# Patient Record
Sex: MALE | Race: WHITE | NOT HISPANIC OR LATINO | Employment: OTHER | URBAN - METROPOLITAN AREA
[De-identification: names, ages, dates, MRNs, and addresses within clinical notes are randomized per-mention and may not be internally consistent; named-entity substitution may affect disease eponyms.]

---

## 2019-08-12 ENCOUNTER — TELEPHONE (OUTPATIENT)
Dept: CARDIOLOGY | Facility: CLINIC | Age: 74
End: 2019-08-12

## 2019-08-12 NOTE — TELEPHONE ENCOUNTER
With Dr. Kirby covering Cath Lab patient will need to see one of the other doctors  Dr. Kirby schedule is full

## 2019-08-12 NOTE — TELEPHONE ENCOUNTER
----- Message from Esperanza MURDOCK Menra sent at 8/12/2019  2:28 PM EDT -----  Regarding: Referral to Dr Kirby  PCP/Referring: Valencia Andrade   DX: calcification of coronary artery   Requested Dr. Kirby   Please advise.  Thank you,  Esperanza

## 2019-08-29 ENCOUNTER — OFFICE VISIT (OUTPATIENT)
Dept: CARDIOLOGY | Facility: CLINIC | Age: 74
End: 2019-08-29

## 2019-08-29 VITALS
DIASTOLIC BLOOD PRESSURE: 80 MMHG | WEIGHT: 211 LBS | HEIGHT: 69 IN | BODY MASS INDEX: 31.25 KG/M2 | SYSTOLIC BLOOD PRESSURE: 136 MMHG | HEART RATE: 61 BPM

## 2019-08-29 DIAGNOSIS — I25.10 CORONARY ARTERY DISEASE INVOLVING NATIVE CORONARY ARTERY OF NATIVE HEART WITHOUT ANGINA PECTORIS: Primary | ICD-10-CM

## 2019-08-29 DIAGNOSIS — I10 ESSENTIAL HYPERTENSION: ICD-10-CM

## 2019-08-29 DIAGNOSIS — E78.2 MIXED HYPERLIPIDEMIA: ICD-10-CM

## 2019-08-29 PROCEDURE — 99204 OFFICE O/P NEW MOD 45 MIN: CPT | Performed by: INTERNAL MEDICINE

## 2019-08-29 RX ORDER — LOSARTAN POTASSIUM 50 MG/1
50 TABLET ORAL DAILY
Refills: 5 | COMMUNITY
Start: 2019-08-02 | End: 2022-11-04 | Stop reason: SDUPTHER

## 2019-08-29 RX ORDER — ALLOPURINOL 100 MG/1
100 TABLET ORAL 2 TIMES DAILY
COMMUNITY

## 2019-08-29 RX ORDER — ASPIRIN 81 MG/1
81 TABLET ORAL DAILY
COMMUNITY
End: 2022-11-17

## 2019-08-29 RX ORDER — LOVASTATIN 10 MG/1
1 TABLET ORAL DAILY
Refills: 2 | COMMUNITY
Start: 2019-08-08 | End: 2019-09-20 | Stop reason: SDUPTHER

## 2019-08-29 RX ORDER — WARFARIN SODIUM 4 MG/1
4 TABLET ORAL DAILY
COMMUNITY

## 2019-08-29 RX ORDER — METOPROLOL SUCCINATE 25 MG/1
0.5 TABLET, EXTENDED RELEASE ORAL DAILY
Refills: 0 | COMMUNITY
Start: 2019-08-03

## 2019-09-04 PROBLEM — I25.10 CORONARY ARTERY DISEASE INVOLVING NATIVE CORONARY ARTERY OF NATIVE HEART WITHOUT ANGINA PECTORIS: Status: ACTIVE | Noted: 2019-09-04

## 2019-09-04 PROBLEM — I10 ESSENTIAL HYPERTENSION: Status: ACTIVE | Noted: 2019-09-04

## 2019-09-04 PROBLEM — E78.2 MIXED HYPERLIPIDEMIA: Status: ACTIVE | Noted: 2019-09-04

## 2019-09-04 PROCEDURE — 93000 ELECTROCARDIOGRAM COMPLETE: CPT | Performed by: INTERNAL MEDICINE

## 2019-09-19 ENCOUNTER — TELEPHONE (OUTPATIENT)
Dept: CARDIOLOGY | Facility: CLINIC | Age: 74
End: 2019-09-19

## 2019-09-19 NOTE — TELEPHONE ENCOUNTER
Pt called to ask about whether he needs to start a medication like Repatha, like you mentioned at his office visit.  I called and got the lab work from his PCP done on 9/4/19 and put it in your inbox to review.  Please advise.    ThanksSylvie    On 8/29/19:     IMPRESSION/PLAN:  1.  Coronary artery disease with prior PCI to the OM.  Continue aspirin and statin.  We will request his more recent lab work including a lipid panel from your office.  No angina.  No ischemic workup at this time.

## 2019-09-20 RX ORDER — LOVASTATIN 20 MG/1
20 TABLET ORAL DAILY
Qty: 30 TABLET | Refills: 1 | Status: SHIPPED | OUTPATIENT
Start: 2019-09-20 | End: 2019-09-20 | Stop reason: SDUPTHER

## 2019-09-20 RX ORDER — LOVASTATIN 20 MG/1
20 TABLET ORAL DAILY
Qty: 90 TABLET | Refills: 1 | Status: SHIPPED | OUTPATIENT
Start: 2019-09-20 | End: 2019-10-22

## 2019-09-20 NOTE — TELEPHONE ENCOUNTER
Pt called back and updated his phone list.     He said that after getting off Crestor his muscle aching went from an 8/10 to 4/10. When he started the Lovastatin it stayed at 4/10. He said he would be willing to try doubling his Lovastatin and if that causes an increase in myalgia, switch to Repatha.    I called the pt saran and sent in an increased dose of the Lovastatin.    Sylvie

## 2019-10-29 ENCOUNTER — TELEPHONE (OUTPATIENT)
Dept: CARDIOLOGY | Facility: CLINIC | Age: 74
End: 2019-10-29

## 2020-03-27 ENCOUNTER — TELEMEDICINE (OUTPATIENT)
Dept: CARDIOLOGY | Facility: CLINIC | Age: 75
End: 2020-03-27

## 2020-03-27 VITALS
DIASTOLIC BLOOD PRESSURE: 82 MMHG | HEART RATE: 82 BPM | WEIGHT: 205 LBS | SYSTOLIC BLOOD PRESSURE: 132 MMHG | HEIGHT: 69 IN | BODY MASS INDEX: 30.36 KG/M2

## 2020-03-27 DIAGNOSIS — I10 ESSENTIAL HYPERTENSION: ICD-10-CM

## 2020-03-27 DIAGNOSIS — I25.10 CORONARY ARTERY DISEASE INVOLVING NATIVE CORONARY ARTERY OF NATIVE HEART WITHOUT ANGINA PECTORIS: ICD-10-CM

## 2020-03-27 DIAGNOSIS — E78.2 MIXED HYPERLIPIDEMIA: Primary | ICD-10-CM

## 2020-03-27 PROCEDURE — 99214 OFFICE O/P EST MOD 30 MIN: CPT | Performed by: INTERNAL MEDICINE

## 2020-03-27 RX ORDER — LOVASTATIN 20 MG/1
TABLET ORAL
COMMUNITY
Start: 2020-03-24 | End: 2020-08-07 | Stop reason: SDUPTHER

## 2020-03-27 RX ORDER — WARFARIN SODIUM 3 MG/1
3 TABLET ORAL DAILY
Status: ON HOLD | COMMUNITY
Start: 2020-03-22 | End: 2022-09-14

## 2020-03-27 RX ORDER — FINASTERIDE 5 MG/1
5 TABLET, FILM COATED ORAL DAILY
Status: ON HOLD | COMMUNITY
Start: 2020-03-11 | End: 2022-09-14

## 2020-03-27 NOTE — PROGRESS NOTES
Miguel Espitia  1945  Date of Office Visit: 03/27/20  Encounter Provider: Tra Ashley MD  Place of Service: [facility] CARDIOLOGY    TELEHEALTH VISIT    CHIEF COMPLAINT:  Coronary disease with prior PCI to the OM  Hyperlipidemia  Hypertension  History of DVT and PE       HISTORY OF PRESENT ILLNESS:  This patient has consented to a telehealth visit via phone. I spent 15 minutes in total including but not limited to the direct conversation with this patient. All vitals recorded within this visit are reported by the patient.  A very pleasant 74-year-old male with medical history of coronary artery disease and prior PCI to the OM branch in 2011, prior DVT and PE, hypertension and hyperlipidemia, who presents to me as a telehealth f/u.   His last ejection fraction was 65% in 2017. He had mild concentric LVH at that time.    The patient states that he has been doing very well since our last visit. He actually did not start taking the Repatha as he stated it was too expensive and has started back on the lovastatin. He reports that he has been working his way back up and now almost every day takes 20 mg of lovastatin. He was due for lab work last week but did not have that done with the coronavirus issues.    He did have dyspnea on exertion when pulling a bunch of bushes out in the yard recently but has not had any previous issues with his normal exercise routine.        Review of Systems   Constitution: Negative for fever and malaise/fatigue.   HENT: Negative for nosebleeds and sore throat.    Eyes: Negative for blurred vision and double vision.   Cardiovascular: Negative for chest pain, claudication, palpitations and syncope.   Respiratory: Negative for cough, shortness of breath and snoring.    Endocrine: Negative for cold intolerance, heat intolerance and polydipsia.   Skin: Negative for itching, poor wound healing and rash.   Musculoskeletal: Negative for joint pain, joint  swelling, muscle weakness and myalgias.   Gastrointestinal: Negative for abdominal pain, melena, nausea and vomiting.   Neurological: Negative for light-headedness, loss of balance, seizures, vertigo and weakness.   Psychiatric/Behavioral: Negative for altered mental status and depression.       Past Medical History:   Diagnosis Date   • Abdominal pain, left lower quadrant    • Abnormal glucose    • Abnormal urine findings    • Acute upper respiratory infection    • Anticoagulated on warfarin    • Atopic dermatitis    • Mendiola's esophagus    • Belching    • Benign enlargement of prostate    • Body aches    • Calcification of coronary artery    • Cough    • Esophageal reflux    • Gout    • Greater trochanteric bursitis of right hip    • History of colon polyps    • Hypercholesteremia    • Hyperglycemia    • Inability to attain erection    • Incidental pulmonary nodule    • Infected nailbed of toe, unspecified laterality    • Influenza B    • Left hip pain    • LLQ abdominal tenderness    • Low back pain    • Lumbar strain    • Neck pain    • Obesity    • Personal history of PE (pulmonary embolism)    • Pes anserinus bursitis of right knee    • Renal cyst    • Right hip pain    • Screen for colon cancer    • Shoulder pain, right    • Sore throat    • Stye    • Tinea versicolor    • TMJ arthralgia    • Trigger finger    • Venous thrombosis    • Vertigo    • Warfarin anticoagulation        The following portions of the patient's history were reviewed and updated as appropriate: Social history , Family history and Surgical history     Current Outpatient Medications on File Prior to Visit   Medication Sig Dispense Refill   • allopurinol (ZYLOPRIM) 100 MG tablet Take 100 mg by mouth 2 (Two) Times a Day.     • aspirin 81 MG EC tablet Take 81 mg by mouth Daily.     • Evolocumab (REPATHA) 140 MG/ML solution prefilled syringe Inject 1 mL under the skin into the appropriate area as directed Every 14 (Fourteen) Days. 2.1 mL 2  "  • losartan (COZAAR) 50 MG tablet Take 100 mg by mouth Daily.  5   • metoprolol succinate XL (TOPROL-XL) 25 MG 24 hr tablet Take 1 tablet by mouth Daily.  0   • warfarin (COUMADIN) 4 MG tablet Take 4 mg by mouth Daily.       No current facility-administered medications on file prior to visit.        Allergies   Allergen Reactions   • Crestor [Rosuvastatin Calcium] Myalgia   • Pravastatin Sodium Myalgia   • Statins Myalgia   • Codeine Rash       Vitals:    03/27/20 1040   BP: 132/82   Pulse: 82   Weight: 93 kg (205 lb)   Height: 175.3 cm (69\")       No results found for: CBC  No results found for: CMP  No components found for: LIPID  No results found for: BMP    Procedures    DISCUSSION/SUMMARY  74-year-old male with a medical history of coronary artery disease with prior PCI to the obtuse marginal branch with a Cypher drug eluting stent, hypertension, hyperlipidemia, and history of PE and DVT, on anticoagulant therapy, who presents to me in f/u.  He is doing very well.  He has a preserved left and right ventricular systolic function.    He denies any new symptoms other than dyspnea on exertion with what sounds to be moderate to high level of activity recently. He has no chest pain. He is back on a statin.          1.  Coronary artery disease with prior PCI to the OM.  Continue aspirin and statin.  We will request his more recent lab work including a lipid panel from your office.  No angina.  No ischemic workup at this time.  2.  Essential hypertension, at goal.  Continue current therapy.  3.  DVT/PE.  This sounds to be unprovoked.  I would recommend continuation of anticoagulant therapy.  He has previously followed with the hematology/oncology group at [place].    I plan to see him back in 6 months or earlier with problems.    "

## 2020-06-23 RX ORDER — LOVASTATIN 20 MG/1
TABLET ORAL
Qty: 90 TABLET | OUTPATIENT
Start: 2020-06-23

## 2020-08-07 RX ORDER — LOVASTATIN 20 MG/1
20 TABLET ORAL NIGHTLY
Qty: 90 TABLET | Refills: 0 | Status: SHIPPED | OUTPATIENT
Start: 2020-08-07 | End: 2020-10-16

## 2020-10-16 ENCOUNTER — LAB (OUTPATIENT)
Dept: LAB | Facility: HOSPITAL | Age: 75
End: 2020-10-16

## 2020-10-16 ENCOUNTER — OFFICE VISIT (OUTPATIENT)
Dept: CARDIOLOGY | Facility: CLINIC | Age: 75
End: 2020-10-16

## 2020-10-16 VITALS
SYSTOLIC BLOOD PRESSURE: 134 MMHG | BODY MASS INDEX: 31.84 KG/M2 | DIASTOLIC BLOOD PRESSURE: 84 MMHG | HEART RATE: 76 BPM | HEIGHT: 69 IN | OXYGEN SATURATION: 95 % | WEIGHT: 215 LBS | RESPIRATION RATE: 18 BRPM

## 2020-10-16 DIAGNOSIS — E78.2 MIXED HYPERLIPIDEMIA: ICD-10-CM

## 2020-10-16 DIAGNOSIS — I25.10 CORONARY ARTERY DISEASE INVOLVING NATIVE CORONARY ARTERY OF NATIVE HEART WITHOUT ANGINA PECTORIS: Primary | ICD-10-CM

## 2020-10-16 DIAGNOSIS — I10 ESSENTIAL HYPERTENSION: ICD-10-CM

## 2020-10-16 DIAGNOSIS — Z86.711 HISTORY OF PULMONARY EMBOLUS (PE): ICD-10-CM

## 2020-10-16 LAB
ALBUMIN SERPL-MCNC: 4 G/DL (ref 3.5–5.2)
ALP SERPL-CCNC: 97 U/L (ref 39–117)
ALT SERPL W P-5'-P-CCNC: 21 U/L (ref 1–41)
AST SERPL-CCNC: 26 U/L (ref 1–40)
BILIRUB CONJ SERPL-MCNC: 0.2 MG/DL (ref 0–0.3)
BILIRUB INDIRECT SERPL-MCNC: 0.2 MG/DL
BILIRUB SERPL-MCNC: 0.4 MG/DL (ref 0–1.2)
CHOLEST SERPL-MCNC: 176 MG/DL (ref 0–200)
HDLC SERPL-MCNC: 40 MG/DL (ref 40–60)
LDLC SERPL CALC-MCNC: 79 MG/DL (ref 0–100)
LDLC/HDLC SERPL: 1.64 {RATIO}
PROT SERPL-MCNC: 7.1 G/DL (ref 6–8.5)
TRIGL SERPL-MCNC: 353 MG/DL (ref 0–150)
VLDLC SERPL-MCNC: 57 MG/DL (ref 5–40)

## 2020-10-16 PROCEDURE — 99214 OFFICE O/P EST MOD 30 MIN: CPT | Performed by: NURSE PRACTITIONER

## 2020-10-16 PROCEDURE — 80076 HEPATIC FUNCTION PANEL: CPT

## 2020-10-16 PROCEDURE — 93000 ELECTROCARDIOGRAM COMPLETE: CPT | Performed by: NURSE PRACTITIONER

## 2020-10-16 PROCEDURE — 80061 LIPID PANEL: CPT

## 2020-10-16 PROCEDURE — 36415 COLL VENOUS BLD VENIPUNCTURE: CPT

## 2020-10-16 RX ORDER — KETOCONAZOLE 20 MG/G
CREAM TOPICAL
Status: ON HOLD | COMMUNITY
Start: 2020-08-18 | End: 2022-09-14

## 2020-10-16 NOTE — PROGRESS NOTES
Date of Office Visit: 10/16/2020  Encounter Provider: MONTEZ Hazel  Place of Service: [facility] CARDIOLOGY  Patient Name: Miguel Espitia  :1945    Chief Complaint   Patient presents with   • Coronary Artery Disease     1 YR FOLLOW UP   • Hyperlipidemia   • Hypertension   :     HPI: Miguel Espitia is a 75 y.o. male, new to me, who presents today for follow-up.  Old records have been obtained and reviewed by me.  He is a patient of Dr. Ashley's with a past cardiac history significant for hypertension, hyperlipidemia, DVT and PE (anticoagulated on warfarin), and coronary artery disease.  In March of this year, he had a telehealth visit with Dr. Ashley at which time he was doing well with no complaints of angina or heart failure.  No changes were made to his medical regimen, and he was advised to follow-up in 6 months.   Overall he has been doing well.  He denies any chest pain, shortness of breath, palpitations, edema, dizziness, or syncope.  He denies any bleeding difficulties or melena.  He developed pretty severe muscle cramping in his thighs so he started taking his lovastatin every other day.  Although it has improved, it is still present.  Prior to the Covid pandemic, he was going to the [facility] on a regular basis and had lost about 15 pounds.  Unfortunately, since he has not been able to go to the gym he has gained most of it back.  He does continue to walk 6 or 7 days a week.      Past Medical History:   Diagnosis Date   • Abdominal pain, left lower quadrant    • Abnormal glucose    • Abnormal urine findings    • Acute upper respiratory infection    • Anticoagulated on warfarin    • Atopic dermatitis    • Mendiola's esophagus    • Belching    • Benign enlargement of prostate    • Body aches    • Calcification of coronary artery    • Cough    • Esophageal reflux    • Gout    • Greater trochanteric bursitis of right hip    • History of colon polyps    •  Hypercholesteremia    • Hyperglycemia    • Inability to attain erection    • Incidental pulmonary nodule    • Infected nailbed of toe, unspecified laterality    • Influenza B    • Left hip pain    • LLQ abdominal tenderness    • Low back pain    • Lumbar strain    • Neck pain    • Obesity    • Personal history of PE (pulmonary embolism)    • Pes anserinus bursitis of right knee    • Renal cyst    • Right hip pain    • Screen for colon cancer    • Shoulder pain, right    • Sore throat    • Stye    • Tinea versicolor    • TMJ arthralgia    • Trigger finger    • Venous thrombosis    • Vertigo    • Warfarin anticoagulation        Past Surgical History:   Procedure Laterality Date   • CARDIAC CATHETERIZATION  11/04/2011    x1 stent at [facility]   • CORONARY ANGIOPLASTY WITH STENT PLACEMENT  11/04/2011    The patient underwent successful angioplasty and stent to the marginal and 95% stenosis reduced to 0%.   • HAND TENDON SURGERY      TENDON REPAIR RIGHT   • HERNIA REPAIR         Social History     Socioeconomic History   • Marital status:      Spouse name: Not on file   • Number of children: Not on file   • Years of education: Not on file   • Highest education level: Not on file   Tobacco Use   • Smoking status: Never Smoker   • Smokeless tobacco: Never Used   • Tobacco comment: CAFFEINE USE: 1 CUP COFFEE DAILY   Substance and Sexual Activity   • Alcohol use: Yes     Alcohol/week: 1.0 - 2.0 standard drinks     Types: 1 - 2 Cans of beer per week     Frequency: 4 or more times a week     Binge frequency: Weekly   • Drug use: No   • Sexual activity: Defer       Family History   Problem Relation Age of Onset   • Arthritis Other    • No Known Problems Mother    • Throat cancer Father        Review of Systems   Constitution: Negative for chills, fever and malaise/fatigue.   Cardiovascular: Negative for chest pain, dyspnea on exertion, leg swelling, near-syncope, orthopnea, palpitations, paroxysmal nocturnal  "dyspnea and syncope.   Respiratory: Negative for cough and shortness of breath.    Musculoskeletal: Positive for myalgias. Negative for joint pain and joint swelling.   Gastrointestinal: Negative for abdominal pain, diarrhea, melena, nausea and vomiting.   Genitourinary: Negative for frequency and hematuria.   Neurological: Negative for light-headedness, numbness, paresthesias and seizures.   Allergic/Immunologic: Negative.    All other systems reviewed and are negative.      Allergies   Allergen Reactions   • Crestor [Rosuvastatin Calcium] Myalgia   • Pravastatin Sodium Myalgia   • Statins Myalgia   • Codeine Rash         Current Outpatient Medications:   •  allopurinol (ZYLOPRIM) 100 MG tablet, Take 100 mg by mouth 2 (Two) Times a Day., Disp: , Rfl:   •  aspirin 81 MG EC tablet, Take 81 mg by mouth Daily., Disp: , Rfl:   •  finasteride (PROSCAR) 5 MG tablet, Take 5 mg by mouth Daily., Disp: , Rfl:   •  ketoconazole (NIZORAL) 2 % cream, APPLY TO GROIN BID PRN, Disp: , Rfl:   •  losartan (COZAAR) 50 MG tablet, Take 50 mg by mouth Daily., Disp: , Rfl: 5  •  metoprolol succinate XL (TOPROL-XL) 25 MG 24 hr tablet, Take 1 tablet by mouth Daily., Disp: , Rfl: 0  •  warfarin (COUMADIN) 3 MG tablet, Take 3 mg by mouth Daily., Disp: , Rfl:   •  warfarin (COUMADIN) 4 MG tablet, Take 4 mg by mouth Daily., Disp: , Rfl:   •  Evolocumab 140 MG/ML solution auto-injector, Inject 1 mL under the skin into the appropriate area as directed Every 14 (Fourteen) Days., Disp: 2 pen, Rfl: 11      Objective:     Vitals:    10/16/20 1110 10/16/20 1121   BP: 134/84 134/84   BP Location: Right arm Left arm   Patient Position: Sitting Sitting   Pulse: 76    Resp: 18    SpO2: 95%    Weight: 97.5 kg (215 lb)    Height: 175.3 cm (69\")      Body mass index is 31.75 kg/m².    PHYSICAL EXAM:    Constitutional:       General: Not in acute distress.     Appearance: Well-developed. Not diaphoretic.   Eyes:      Pupils: Pupils are equal, round, and " reactive to light.   HENT:      Head: Normocephalic and atraumatic.   Neck:      Thyroid: No thyromegaly.      Vascular: No JVD.   Pulmonary:      Effort: Pulmonary effort is normal. No respiratory distress.      Breath sounds: Normal breath sounds.   Cardiovascular:      Normal rate. Regular rhythm.   Pulses:     Intact distal pulses.   Abdominal:      General: Bowel sounds are normal. There is no distension.      Palpations: Abdomen is soft. There is no hepatomegaly or splenomegaly.      Tenderness: There is no abdominal tenderness.   Musculoskeletal: Normal range of motion.   Skin:     General: Skin is warm and dry.      Findings: No erythema.   Neurological:      Mental Status: Alert and oriented to person, place, and time.   Psychiatric:         Behavior: Behavior normal.         Judgment: Judgment normal.           ECG 12 Lead    Date/Time: 10/16/2020 11:27 AM  Performed by: Lucia Oswald APRN  Authorized by: Lucia Oswald APRN   Comparison: compared with previous ECG from 8/29/2019  Similar to previous ECG  Rhythm: sinus rhythm  Rate: normal  BPM: 65    Clinical impression: normal ECG  Comments: Indication: CAD              Assessment:       Diagnosis Plan   1. Coronary artery disease involving native coronary artery of native heart without angina pectoris  ECG 12 Lead   2. Essential hypertension     3. Mixed hyperlipidemia  Lipid Panel    Hepatic Function Panel    Evolocumab 140 MG/ML solution auto-injector   4. History of pulmonary embolus (PE)       Orders Placed This Encounter   Procedures   • Lipid Panel     Standing Status:   Future     Standing Expiration Date:   10/16/2021   • Hepatic Function Panel     Standing Status:   Future     Standing Expiration Date:   10/16/2021   • ECG 12 Lead     This order was created via procedure documentation          Plan:       1.  Coronary artery disease.  He denies any symptoms of angina.  He is on good medical therapy.      2.  Hypertension.  His  blood pressure is well controlled.      3.  Hyperlipidemia.  The last lipid panel I have on file is from September 2019.  This revealed an LDL of 94 and an HDL of 42.  He is reporting myalgias on the lovastatin.  He has previously been intolerant to rosuvastatin and pravastatin.  At this point, I am going to have him stop the lovastatin and instead prescribe Repatha.  I will send him to the lab for a lipid panel and hepatic function panel.      4.  History of DVT and PE.  Continue warfarin.      Overall think he is stable and doing well.  He denies any symptoms of angina or heart failure.  He will follow-up with Dr. Ashley in 6 months or sooner if needed.      As always, it has been a pleasure to participate in your patient's care.      Sincerely,         MONTEZ Rivrea

## 2020-10-19 ENCOUNTER — TELEPHONE (OUTPATIENT)
Dept: CARDIOLOGY | Facility: CLINIC | Age: 75
End: 2020-10-19

## 2020-10-19 NOTE — TELEPHONE ENCOUNTER
Please follow-up on the Repatha which I ordered on Friday.      Also let him know that his labs are stable.  His triglycerides are elevated, so he should focus on weight loss and limiting his sugar intake.

## 2020-10-21 NOTE — TELEPHONE ENCOUNTER
Pt returned my call stating to L/M if he didn't answer upon returning his call.    I L/M advising of labs and recommendations. Advised pt to call office w/ any further questions or concerns.    LO Garza

## 2020-10-26 NOTE — TELEPHONE ENCOUNTER
Pt L/M confirming he had received my message. Pt requested I leave lab level #'s on his VM, if he didn't answer upon returning his call.    I L/M advising of lab levels. I also advised that he call me back re: needing some assistance w/ paying for Repatha, to to the cost of $400. As, I will need some info from him start initiate pt assistance.    LO Garza

## 2021-01-19 ENCOUNTER — TELEPHONE (OUTPATIENT)
Dept: CARDIOLOGY | Facility: CLINIC | Age: 76
End: 2021-01-19

## 2021-01-19 DIAGNOSIS — E78.2 MIXED HYPERLIPIDEMIA: ICD-10-CM

## 2021-01-19 NOTE — TELEPHONE ENCOUNTER
Praluent Approved through 7/19/2021.    L/M advising pt of change. Advised to call office w/ any questions or issues getting medication.    LO Garza

## 2021-01-19 NOTE — TELEPHONE ENCOUNTER
----- Message from Kayla Buenrostro MA sent at 1/19/2021  3:31 PM EST -----  Regarding: Med Change  Pt needs Praluent instead of Repatha. Already did PA by accident, but it was for Praluent and it was approved.    My mistake.

## 2021-01-21 NOTE — TELEPHONE ENCOUNTER
S/W pt re: change. Pt stated he now has a pt assist program suggested to him by Alonso Lewis that is now fully covering his Repatha. Pt stated he has been doing great w/ the Repatha and requested we cancel Praluent Rx and replace Repatha. Verbalized understanding.  Place change Rx back to Repatha.    LO Garza

## 2021-01-22 NOTE — TELEPHONE ENCOUNTER
L/M advising pt of changed. Advised to call office w/ any further questions or concerns.    LO Garza

## 2021-05-24 ENCOUNTER — OFFICE VISIT (OUTPATIENT)
Dept: CARDIOLOGY | Facility: CLINIC | Age: 76
End: 2021-05-24

## 2021-05-24 VITALS
HEIGHT: 69 IN | SYSTOLIC BLOOD PRESSURE: 132 MMHG | DIASTOLIC BLOOD PRESSURE: 84 MMHG | HEART RATE: 65 BPM | WEIGHT: 210 LBS | BODY MASS INDEX: 31.1 KG/M2

## 2021-05-24 DIAGNOSIS — E78.2 MIXED HYPERLIPIDEMIA: Primary | ICD-10-CM

## 2021-05-24 DIAGNOSIS — I25.10 CORONARY ARTERY DISEASE INVOLVING NATIVE CORONARY ARTERY OF NATIVE HEART WITHOUT ANGINA PECTORIS: ICD-10-CM

## 2021-05-24 DIAGNOSIS — Z86.711 HISTORY OF PULMONARY EMBOLUS (PE): ICD-10-CM

## 2021-05-24 DIAGNOSIS — I10 ESSENTIAL HYPERTENSION: ICD-10-CM

## 2021-05-24 PROCEDURE — 93000 ELECTROCARDIOGRAM COMPLETE: CPT | Performed by: INTERNAL MEDICINE

## 2021-05-24 PROCEDURE — 99214 OFFICE O/P EST MOD 30 MIN: CPT | Performed by: INTERNAL MEDICINE

## 2021-05-24 NOTE — PROGRESS NOTES
Date of Office Visit: 21  Encounter Provider: Tra Ashley MD  Place of Service: [facility] CARDIOLOGY  Patient Name: Miguel Espitia  :1945    Chief Complaint   Patient presents with   • Coronary artery disease involving native coronary artery of    • Follow-up   :     HPI:  75 y.o. male who presents today for follow-up.  Old records have been obtained and reviewed by me.  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  In 2020, he had a telehealth visit with me at which time he was doing well with no complaints of angina or heart failure.  No changes were made to his medical regimen, and he was advised to follow-up in 6 months.    He presents back to see me and is doing very well.  He denies any chest pain or dyspnea.  He continues to tolerate his warfarin therapy with no bleeding complications.  His blood pressure and heart rate are well controlled.  The Repatha seems to be working well for him, and his LDL is very well controlled.             Past Medical History:   Diagnosis Date   • Abdominal pain, left lower quadrant    • Abnormal glucose    • Abnormal urine findings    • Acute upper respiratory infection    • Anticoagulated on warfarin    • Atopic dermatitis    • Mendiola's esophagus    • Belching    • Benign enlargement of prostate    • Body aches    • Calcification of coronary artery    • Cough    • Esophageal reflux    • Gout    • Greater trochanteric bursitis of right hip    • History of colon polyps    • Hypercholesteremia    • Hyperglycemia    • Inability to attain erection    • Incidental pulmonary nodule    • Infected nailbed of toe, unspecified laterality    • Influenza B    • Left hip pain    • LLQ abdominal tenderness    • Low back pain    • Lumbar strain    • Neck pain    • Obesity    • Personal history of PE (pulmonary  embolism)    • Pes anserinus bursitis of right knee    • Renal cyst    • Right hip pain    • Screen for colon cancer    • Shoulder pain, right    • Sore throat    • Stye    • Tinea versicolor    • TMJ arthralgia    • Trigger finger    • Venous thrombosis    • Vertigo    • Warfarin anticoagulation        Past Surgical History:   Procedure Laterality Date   • CARDIAC CATHETERIZATION  11/04/2011    x1 stent at [facility]   • CORONARY ANGIOPLASTY WITH STENT PLACEMENT  11/04/2011    The patient underwent successful angioplasty and stent to the marginal and 95% stenosis reduced to 0%.   • HAND TENDON SURGERY      TENDON REPAIR RIGHT   • HERNIA REPAIR         Social History     Socioeconomic History   • Marital status:      Spouse name: Not on file   • Number of children: Not on file   • Years of education: Not on file   • Highest education level: Not on file   Tobacco Use   • Smoking status: Never Smoker   • Smokeless tobacco: Never Used   • Tobacco comment: CAFFEINE USE: 1 CUP COFFEE DAILY   Substance and Sexual Activity   • Alcohol use: Yes     Alcohol/week: 1.0 - 2.0 standard drinks     Types: 1 - 2 Cans of beer per week   • Drug use: No   • Sexual activity: Defer       Family History   Problem Relation Age of Onset   • Arthritis Other    • No Known Problems Mother    • Throat cancer Father        Review of Systems   Constitutional: Negative for chills, fever and malaise/fatigue.   Cardiovascular: Negative for chest pain, dyspnea on exertion, leg swelling, near-syncope, orthopnea, palpitations, paroxysmal nocturnal dyspnea and syncope.   Respiratory: Negative for cough and shortness of breath.    Musculoskeletal: Positive for myalgias. Negative for joint pain and joint swelling.   Gastrointestinal: Negative for abdominal pain, diarrhea, melena, nausea and vomiting.   Genitourinary: Negative for frequency and hematuria.   Neurological: Negative for light-headedness, numbness, paresthesias and seizures.  "  Allergic/Immunologic: Negative.    All other systems reviewed and are negative.      Allergies   Allergen Reactions   • Crestor [Rosuvastatin Calcium] Myalgia   • Pravastatin Sodium Myalgia   • Statins Myalgia   • Codeine Rash         Current Outpatient Medications:   •  allopurinol (ZYLOPRIM) 100 MG tablet, Take 100 mg by mouth 2 (Two) Times a Day., Disp: , Rfl:   •  aspirin 81 MG EC tablet, Take 81 mg by mouth Daily., Disp: , Rfl:   •  Evolocumab (REPATHA) solution auto-injector SureClick injection, Inject 1 mL under the skin into the appropriate area as directed Every 14 (Fourteen) Days., Disp: 2 pen, Rfl: 11  •  finasteride (PROSCAR) 5 MG tablet, Take 5 mg by mouth Daily., Disp: , Rfl:   •  ketoconazole (NIZORAL) 2 % cream, APPLY TO GROIN BID PRN, Disp: , Rfl:   •  losartan (COZAAR) 50 MG tablet, Take 50 mg by mouth Daily., Disp: , Rfl: 5  •  metoprolol succinate XL (TOPROL-XL) 25 MG 24 hr tablet, Take 1 tablet by mouth Daily., Disp: , Rfl: 0  •  warfarin (COUMADIN) 3 MG tablet, Take 3 mg by mouth Daily., Disp: , Rfl:   •  warfarin (COUMADIN) 4 MG tablet, Take 4 mg by mouth Daily., Disp: , Rfl:       Objective:     Vitals:    05/24/21 1253   BP: 132/84   BP Location: Left arm   Patient Position: Sitting   Pulse: 65   Weight: 95.3 kg (210 lb)   Height: 175.3 cm (69\")     Body mass index is 31.01 kg/m².    PHYSICAL EXAM:    Constitutional:       General: Not in acute distress.     Appearance: Well-developed. Not diaphoretic.   Eyes:      Pupils: Pupils are equal, round, and reactive to light.   HENT:      Head: Normocephalic and atraumatic.   Neck:      Thyroid: No thyromegaly.      Vascular: No JVD.   Pulmonary:      Effort: Pulmonary effort is normal. No respiratory distress.      Breath sounds: Normal breath sounds.   Cardiovascular:      Normal rate. Regular rhythm.   Pulses:     Intact distal pulses.   Abdominal:      General: Bowel sounds are normal. There is no distension.      Palpations: Abdomen is " soft. There is no hepatomegaly or splenomegaly.      Tenderness: There is no abdominal tenderness.   Musculoskeletal: Normal range of motion. Skin:     General: Skin is warm and dry.      Findings: No erythema.   Neurological:      Mental Status: Alert and oriented to person, place, and time.   Psychiatric:         Behavior: Behavior normal.         Judgment: Judgment normal.           ECG 12 Lead    Date/Time: 5/24/2021 1:25 PM  Performed by: Tra Ashley MD  Authorized by: Tra Ashley MD   Comparison: compared with previous ECG from 10/16/2020  Similar to previous ECG  Rhythm: sinus rhythm  Rate: normal  QRS axis: normal                Assessment:      No diagnosis found.  No orders of the defined types were placed in this encounter.         Plan:     This is a 75-year-old male with medical history of coronary artery disease, hypertension, hyperlipidemia and a prior history of DVT and pulmonary embolus on lifelong anticoagulant therapy.  He is doing very well and denies any chest pain.  He is tolerating his anticoagulation with no bleeding complications.  His lipid panel is much better controlled on Repatha.      1.  Coronary artery disease.  He denies any symptoms of angina.  He is on good medical therapy.  -Continue aspirin lifelong.  -Continue Repatha.  Tolerating well.  No issues with injection site rashes or significant soreness.    2.  Hypertension.  His blood pressure is well controlled.  -Continue losartan and Toprol therapy.  Lab work has been reviewed and no hyperkalemia or renal insufficiency.  No resting bradycardia or fatigue on beta-blockade.    3.  Hyperlipidemia.  Continue Repatha.  Tolerating well.  Lipid panel reviewed.  No elevation in transaminases on CMP.      4.  History of DVT and PE.  Continue warfarin.  Lifelong.

## 2021-12-01 ENCOUNTER — OFFICE VISIT (OUTPATIENT)
Dept: CARDIOLOGY | Facility: CLINIC | Age: 76
End: 2021-12-01

## 2021-12-01 VITALS
DIASTOLIC BLOOD PRESSURE: 72 MMHG | WEIGHT: 212 LBS | HEIGHT: 69 IN | BODY MASS INDEX: 31.4 KG/M2 | HEART RATE: 76 BPM | SYSTOLIC BLOOD PRESSURE: 116 MMHG

## 2021-12-01 DIAGNOSIS — I25.10 CORONARY ARTERY DISEASE INVOLVING NATIVE CORONARY ARTERY OF NATIVE HEART WITHOUT ANGINA PECTORIS: Primary | ICD-10-CM

## 2021-12-01 DIAGNOSIS — I10 ESSENTIAL HYPERTENSION: ICD-10-CM

## 2021-12-01 DIAGNOSIS — Z86.711 HISTORY OF PULMONARY EMBOLUS (PE): ICD-10-CM

## 2021-12-01 DIAGNOSIS — E78.2 MIXED HYPERLIPIDEMIA: ICD-10-CM

## 2021-12-01 PROCEDURE — 99214 OFFICE O/P EST MOD 30 MIN: CPT | Performed by: NURSE PRACTITIONER

## 2021-12-01 PROCEDURE — 93000 ELECTROCARDIOGRAM COMPLETE: CPT | Performed by: NURSE PRACTITIONER

## 2021-12-01 NOTE — PROGRESS NOTES
Date of Office Visit: 2021  Encounter Provider: MONTEZ Cano  Place of Service: [facility] CARDIOLOGY  Patient Name: Miguel Espitia  :1945    Chief Complaint   Patient presents with   • Coronary Artery Disease   :     HPI: Miguel Espitia is a 76 y.o. male.  He is a patient of Dr. Ashley's whom we follow for the management of hypertension, hyperlipidemia, and coronary artery disease.  In , he underwent successful angioplasty and stenting of the marginal.  He also has a history of DVT and PE and has been anticoagulated on warfarin.   He was last seen in the office by Dr. Ashley in May at which time he was doing well with no complaints of angina or heart failure.  No changes were made to his medical regimen, and he was advised to follow-up in 6 months.   From a cardiac standpoint, he has been doing well.  He denies any chest pain, shortness of breath, palpitations, edema, dizziness, or syncope.  He denies any bleeding difficulties or melena.  He suffered a left meniscus injury which has dampened his activity level.  He is in physical therapy.     Past Medical History:   Diagnosis Date   • Abdominal pain, left lower quadrant    • Abnormal glucose    • Abnormal urine findings    • Acute upper respiratory infection    • Anticoagulated on warfarin    • Atopic dermatitis    • Mendiola's esophagus    • Belching    • Benign enlargement of prostate    • Body aches    • Calcification of coronary artery    • Cough    • Esophageal reflux    • Gout    • Greater trochanteric bursitis of right hip    • History of colon polyps    • Hypercholesteremia    • Hyperglycemia    • Inability to attain erection    • Incidental pulmonary nodule    • Infected nailbed of toe, unspecified laterality    • Influenza B    • Left hip pain    • LLQ abdominal tenderness    • Low back pain    • Lumbar strain    • Neck pain    • Obesity    • Personal history of PE (pulmonary embolism)    • Pes  anserinus bursitis of right knee    • Renal cyst    • Right hip pain    • Screen for colon cancer    • Shoulder pain, right    • Sore throat    • Stye    • Tinea versicolor    • TMJ arthralgia    • Trigger finger    • Venous thrombosis    • Vertigo    • Warfarin anticoagulation        Past Surgical History:   Procedure Laterality Date   • CARDIAC CATHETERIZATION  11/04/2011    x1 stent at [facility]   • CORONARY ANGIOPLASTY WITH STENT PLACEMENT  11/04/2011    The patient underwent successful angioplasty and stent to the marginal and 95% stenosis reduced to 0%.   • HAND TENDON SURGERY      TENDON REPAIR RIGHT   • HERNIA REPAIR         Social History     Socioeconomic History   • Marital status:    Tobacco Use   • Smoking status: Never Smoker   • Smokeless tobacco: Never Used   • Tobacco comment: CAFFEINE USE: 1 CUP COFFEE DAILY   Substance and Sexual Activity   • Alcohol use: Yes     Alcohol/week: 1.0 - 2.0 standard drink     Types: 1 - 2 Cans of beer per week   • Drug use: No   • Sexual activity: Defer       Family History   Problem Relation Age of Onset   • Arthritis Other    • No Known Problems Mother    • Throat cancer Father        Review of Systems   Constitutional: Negative.   Cardiovascular: Negative.  Negative for chest pain, dyspnea on exertion, leg swelling, orthopnea, paroxysmal nocturnal dyspnea and syncope.   Respiratory: Negative.    Hematologic/Lymphatic: Negative for bleeding problem.   Musculoskeletal: Negative for falls.   Gastrointestinal: Negative for melena.   Neurological: Negative for dizziness and light-headedness.       Allergies   Allergen Reactions   • Crestor [Rosuvastatin Calcium] Myalgia   • Pravastatin Sodium Myalgia   • Statins Myalgia   • Codeine Rash         Current Outpatient Medications:   •  allopurinol (ZYLOPRIM) 100 MG tablet, Take 100 mg by mouth 2 (Two) Times a Day., Disp: , Rfl:   •  aspirin 81 MG EC tablet, Take 81 mg by mouth Daily., Disp: , Rfl:   •   "Evolocumab (REPATHA) solution auto-injector SureClick injection, Inject 1 mL under the skin into the appropriate area as directed Every 14 (Fourteen) Days., Disp: 2 pen, Rfl: 11  •  finasteride (PROSCAR) 5 MG tablet, Take 5 mg by mouth Daily., Disp: , Rfl:   •  ketoconazole (NIZORAL) 2 % cream, APPLY TO GROIN BID PRN, Disp: , Rfl:   •  losartan (COZAAR) 50 MG tablet, Take 50 mg by mouth Daily., Disp: , Rfl: 5  •  metoprolol succinate XL (TOPROL-XL) 25 MG 24 hr tablet, Take 0.5 tablets by mouth Daily., Disp: , Rfl: 0  •  warfarin (COUMADIN) 3 MG tablet, Take 3 mg by mouth Daily., Disp: , Rfl:   •  warfarin (COUMADIN) 4 MG tablet, Take 4 mg by mouth Daily., Disp: , Rfl:       Objective:     Vitals:    12/01/21 1305   BP: 116/72   Pulse: 76   Weight: 96.2 kg (212 lb)   Height: 175.3 cm (69\")     Body mass index is 31.31 kg/m².    PHYSICAL EXAM:    Neck:      Vascular: No JVD.   Pulmonary:      Effort: Pulmonary effort is normal.      Breath sounds: Normal breath sounds.   Cardiovascular:      Normal rate. Regular rhythm.      Murmurs: There is no murmur.      No gallop. No click. No rub.   Pulses:     Intact distal pulses.           ECG 12 Lead    Date/Time: 12/1/2021 1:13 PM  Performed by: Lucia Oswald APRN  Authorized by: Lucia Oswald APRN   Comparison: compared with previous ECG from 5/24/2021  Similar to previous ECG  Rhythm: sinus rhythm  Rate: normal  BPM: 76  Other findings: non-specific ST-T wave changes    Clinical impression: normal ECG  Comments: Indication: CAD              Assessment:       Diagnosis Plan   1. Coronary artery disease involving native coronary artery of native heart without angina pectoris  ECG 12 Lead   2. Essential hypertension     3. Mixed hyperlipidemia     4. History of pulmonary embolus (PE)       Orders Placed This Encounter   Procedures   • ECG 12 Lead     This order was created via procedure documentation     Order Specific Question:   Release to patient     Answer: "   Immediate          Plan:       1. Coronary artery disease.  Continue aspirin and Repatha.      2.   Hypertension.  His blood pressure looks great.  Continue losartan and Toprol.      3.   Hyperlipidemia.  He is statin intolerant.  Continue Repatha.      4.   History of DVT/PE.  Continue warfarin lifelong.      I think he is doing well.  He denies any symptoms of angina or heart failure.  I am not going to make any changes, and he will follow-up with Dr. Ashley in 6 months.      As always, it has been a pleasure to participate in your patient's care.      Sincerely,         MONTEZ Rivera

## 2022-01-06 DIAGNOSIS — E78.2 MIXED HYPERLIPIDEMIA: ICD-10-CM

## 2022-01-07 NOTE — TELEPHONE ENCOUNTER
Please advise filling Alonso THOMAS   -   12/1/21 Lucia Colon   -   6/23/22  Semder  Last labs   -   No recent Lipid Panel    Michelle PERSAUD

## 2022-01-10 RX ORDER — EVOLOCUMAB 140 MG/ML
INJECTION, SOLUTION SUBCUTANEOUS
Qty: 2 ML | Refills: 11 | Status: SHIPPED | OUTPATIENT
Start: 2022-01-10 | End: 2022-03-30

## 2022-03-30 DIAGNOSIS — E78.2 MIXED HYPERLIPIDEMIA: ICD-10-CM

## 2022-03-30 RX ORDER — EVOLOCUMAB 140 MG/ML
INJECTION, SOLUTION SUBCUTANEOUS
Qty: 2 ML | Refills: 11 | Status: SHIPPED | OUTPATIENT
Start: 2022-03-30 | End: 2023-03-06

## 2022-06-12 NOTE — PROGRESS NOTES
"Chief Complaint   Patient presents with   • GI Problem           History of Present Illness  Patient here for consultation regarding Mendiola's esophagus. His last EGD was around 3 years ago.    He has been taking Prilosec and reports good control of heartburn and reflux symptoms. Denies dysphagia.    His last colonoscopy was 3 years ago. He reports he had one polyp on this exam.    Denies any FH of esophageal cancer.     Result Review :       Progress Notes by Lucia Oswald APRN (12/01/2021 13:00)  Lipid Panel (10/16/2020 11:57)  SCANNED - LABS (09/04/2019)      Vital Signs:   /70   Pulse 87   Temp 97.9 °F (36.6 °C)   Ht 175.3 cm (69\")   Wt 97.3 kg (214 lb 6.4 oz)   SpO2 95%   BMI 31.66 kg/m²     Body mass index is 31.66 kg/m².     Physical Exam  Vitals reviewed.   Constitutional:       Appearance: Normal appearance.   Abdominal:      General: Bowel sounds are normal. There is no distension.      Palpations: Abdomen is soft. Abdomen is not rigid. There is no mass or pulsatile mass.      Tenderness: There is no abdominal tenderness. There is no guarding or rebound.           Assessment and Plan    Diagnoses and all orders for this visit:    1. Mendiola's esophagus without dysplasia (Primary)    2. Encounter for screening colonoscopy         BRIEF SUMMARY  Patient for consultation regarding Mendiola's esophagus.  He is due for surveillance EGD and we will proceed with EGD with biopsy.  His colon cancer screening is reportedly up-to-date.  We advised him regarding antireflux measures.    I have reviewed and confirmed the accuracy of the HPI and Assessment and Plan as documented by the APRN MONTEZ Denise        Follow Up   No follow-ups on file.    Patient Instructions   Schedule EGD for surveillance of Mendiola's esophagus.    For GERD with Mendiola's esophagus, continue Prilosec daily.        Documentation by Kailyn HERRMANN acting as a scribe in the following sections on behalf of the " billable provider: HPI, ROS, assessment, & plan.

## 2022-06-13 ENCOUNTER — PREP FOR SURGERY (OUTPATIENT)
Dept: SURGERY | Facility: SURGERY CENTER | Age: 77
End: 2022-06-13

## 2022-06-13 ENCOUNTER — OFFICE VISIT (OUTPATIENT)
Dept: GASTROENTEROLOGY | Facility: CLINIC | Age: 77
End: 2022-06-13

## 2022-06-13 VITALS
HEART RATE: 87 BPM | OXYGEN SATURATION: 95 % | BODY MASS INDEX: 31.76 KG/M2 | SYSTOLIC BLOOD PRESSURE: 122 MMHG | TEMPERATURE: 97.9 F | WEIGHT: 214.4 LBS | DIASTOLIC BLOOD PRESSURE: 70 MMHG | HEIGHT: 69 IN

## 2022-06-13 DIAGNOSIS — K22.70 BARRETT'S ESOPHAGUS WITHOUT DYSPLASIA: Primary | ICD-10-CM

## 2022-06-13 DIAGNOSIS — Z12.11 ENCOUNTER FOR SCREENING COLONOSCOPY: ICD-10-CM

## 2022-06-13 PROCEDURE — 99204 OFFICE O/P NEW MOD 45 MIN: CPT | Performed by: INTERNAL MEDICINE

## 2022-06-13 RX ORDER — DUTASTERIDE 0.5 MG/1
CAPSULE, LIQUID FILLED ORAL
COMMUNITY
Start: 2022-04-19

## 2022-06-13 RX ORDER — SODIUM CHLORIDE 0.9 % (FLUSH) 0.9 %
10 SYRINGE (ML) INJECTION AS NEEDED
Status: CANCELLED | OUTPATIENT
Start: 2022-06-13

## 2022-06-13 RX ORDER — SODIUM CHLORIDE 0.9 % (FLUSH) 0.9 %
3 SYRINGE (ML) INJECTION EVERY 12 HOURS SCHEDULED
Status: CANCELLED | OUTPATIENT
Start: 2022-06-13

## 2022-06-13 RX ORDER — SODIUM CHLORIDE, SODIUM LACTATE, POTASSIUM CHLORIDE, CALCIUM CHLORIDE 600; 310; 30; 20 MG/100ML; MG/100ML; MG/100ML; MG/100ML
30 INJECTION, SOLUTION INTRAVENOUS CONTINUOUS PRN
Status: CANCELLED | OUTPATIENT
Start: 2022-06-13

## 2022-06-13 NOTE — PATIENT INSTRUCTIONS
Schedule EGD for surveillance of Mendiola's esophagus.    For GERD with Mendiola's esophagus, continue Prilosec daily.

## 2022-06-23 ENCOUNTER — TELEPHONE (OUTPATIENT)
Dept: GASTROENTEROLOGY | Facility: CLINIC | Age: 77
End: 2022-06-23

## 2022-06-23 ENCOUNTER — OFFICE VISIT (OUTPATIENT)
Dept: CARDIOLOGY | Facility: CLINIC | Age: 77
End: 2022-06-23

## 2022-06-23 VITALS
HEIGHT: 69 IN | HEART RATE: 82 BPM | WEIGHT: 210 LBS | DIASTOLIC BLOOD PRESSURE: 70 MMHG | BODY MASS INDEX: 31.1 KG/M2 | SYSTOLIC BLOOD PRESSURE: 110 MMHG

## 2022-06-23 DIAGNOSIS — I10 ESSENTIAL HYPERTENSION: ICD-10-CM

## 2022-06-23 DIAGNOSIS — E78.2 MIXED HYPERLIPIDEMIA: ICD-10-CM

## 2022-06-23 DIAGNOSIS — I25.10 CORONARY ARTERY DISEASE INVOLVING NATIVE CORONARY ARTERY OF NATIVE HEART WITHOUT ANGINA PECTORIS: Primary | ICD-10-CM

## 2022-06-23 PROCEDURE — 99214 OFFICE O/P EST MOD 30 MIN: CPT | Performed by: INTERNAL MEDICINE

## 2022-06-23 PROCEDURE — 93000 ELECTROCARDIOGRAM COMPLETE: CPT | Performed by: INTERNAL MEDICINE

## 2022-06-23 NOTE — PROGRESS NOTES
Date of Office Visit: 22    Encounter Provider: Tra Ashley MD  Place of Service: [facility] CARDIOLOGY  Patient Name: Miguel Espitia  :1945    Chief complaint  CAD with prior PCI  HTN  Hyperlipidemia  History of DVT and PE    HPI:   76 y.o. male with a medical history of hypertension, hyperlipidemia, and coronary artery disease.  In , he underwent successful angioplasty and stenting of the marginal.  He also has a history of DVT and PE and has been anticoagulated on warfarin.  He presents back in follow-up and has been doing very well.  He denies any chest pain or dyspnea on exertion.  He does state that he will occasionally take a very deep breath when he is walking but does not really feel like he is short of air.  He also states that he will have bruising with his Repatha injection while on his anticoagulant.         Past Medical History:   Diagnosis Date   • Abdominal pain, left lower quadrant    • Abnormal glucose    • Abnormal urine findings    • Acute upper respiratory infection    • Anticoagulated on warfarin    • Atopic dermatitis    • Mendiola's esophagus    • Belching    • Benign enlargement of prostate    • Blood clotting disorder (HCC)    • Body aches    • Calcification of coronary artery    • Cough    • DVT, lower extremity (HCC)    • Esophageal reflux    • Gout    • Greater trochanteric bursitis of right hip    • History of colon polyps    • Hypercholesteremia    • Hyperglycemia    • Hypertension    • Hypertrophy of prostate     Benign   • Inability to attain erection    • Incidental pulmonary nodule    • Infected nailbed of toe, unspecified laterality    • Influenza B    • Left hip pain    • LLQ abdominal tenderness    • Low back pain    • Lumbar strain    • Molluscum contagiosum    • Neck pain    • Notalgia paresthetica    • Obesity    • Osteoarthritis    • Personal history of PE (pulmonary embolism)    • Pes anserinus bursitis of right  knee    • Pulmonary nodule, right    • Renal cyst     Right   • Rheumatic fever    • Right hip pain    • Screen for colon cancer    • Shoulder pain, right    • Sore throat    • Stye    • Tinea versicolor    • TMJ arthralgia    • Trigger finger    • Venous thrombosis    • Vertigo    • Warfarin anticoagulation        Past Surgical History:   Procedure Laterality Date   • CARDIAC CATHETERIZATION  11/04/2011    x1 stent at [facility]   • CORONARY ANGIOPLASTY WITH STENT PLACEMENT  11/04/2011    The patient underwent successful angioplasty and stent to the marginal and 95% stenosis reduced to 0%.   • HAND TENDON SURGERY      TENDON REPAIR RIGHT   • HERNIA REPAIR         Social History     Socioeconomic History   • Marital status:    Tobacco Use   • Smoking status: Never Smoker   • Smokeless tobacco: Never Used   • Tobacco comment: CAFFEINE USE: 1 CUP COFFEE DAILY   Vaping Use   • Vaping Use: Never used   Substance and Sexual Activity   • Alcohol use: Yes     Alcohol/week: 1.0 - 2.0 standard drink     Types: 1 - 2 Cans of beer per week   • Drug use: No   • Sexual activity: Defer       Family History   Problem Relation Age of Onset   • No Known Problems Mother    • Throat cancer Father    • Arthritis Other    • Colon cancer Neg Hx    • Colon polyps Neg Hx    • Crohn's disease Neg Hx    • Irritable bowel syndrome Neg Hx    • Ulcerative colitis Neg Hx        Review of Systems   Constitutional: Negative. Negative for fever and malaise/fatigue.   HENT: Negative for nosebleeds and sore throat.    Eyes: Negative for blurred vision and double vision.   Cardiovascular: Negative.  Negative for chest pain, claudication, dyspnea on exertion, leg swelling, orthopnea, palpitations, paroxysmal nocturnal dyspnea and syncope.   Respiratory: Negative.  Negative for cough, shortness of breath and snoring.    Endocrine: Negative for cold intolerance, heat intolerance and polydipsia.   Hematologic/Lymphatic: Negative for bleeding  problem.   Skin: Negative for itching, poor wound healing and rash.   Musculoskeletal: Negative for falls, joint pain, joint swelling, muscle weakness and myalgias.   Gastrointestinal: Negative for abdominal pain, melena, nausea and vomiting.   Neurological: Negative for dizziness, light-headedness, loss of balance, seizures, vertigo and weakness.   Psychiatric/Behavioral: Negative for altered mental status and depression.       Allergies   Allergen Reactions   • Crestor [Rosuvastatin Calcium] Myalgia   • Pravastatin Sodium Myalgia   • Statins Myalgia   • Codeine Rash         Current Outpatient Medications:   •  allopurinol (ZYLOPRIM) 100 MG tablet, Take 100 mg by mouth 2 (Two) Times a Day., Disp: , Rfl:   •  aspirin 81 MG EC tablet, Take 81 mg by mouth Daily., Disp: , Rfl:   •  dutasteride (AVODART) 0.5 MG capsule, , Disp: , Rfl:   •  finasteride (PROSCAR) 5 MG tablet, Take 5 mg by mouth Daily., Disp: , Rfl:   •  ketoconazole (NIZORAL) 2 % cream, APPLY TO GROIN BID PRN, Disp: , Rfl:   •  losartan (COZAAR) 50 MG tablet, Take 50 mg by mouth Daily., Disp: , Rfl: 5  •  metoprolol succinate XL (TOPROL-XL) 25 MG 24 hr tablet, Take 0.5 tablets by mouth Daily., Disp: , Rfl: 0  •  Repatha SureClick solution auto-injector SureClick injection, INJECT 1 ML UNDER THE SKIN EVERY 14 DAYS, Disp: 2 mL, Rfl: 11  •  warfarin (COUMADIN) 3 MG tablet, Take 3 mg by mouth Daily., Disp: , Rfl:   •  warfarin (COUMADIN) 4 MG tablet, Take 4 mg by mouth Daily., Disp: , Rfl:       Objective:     There were no vitals filed for this visit.  There is no height or weight on file to calculate BMI.    PHYSICAL EXAM:    Constitutional:       Appearance: Well-developed.   Eyes:      General: No scleral icterus.     Conjunctiva/sclera: Conjunctivae normal.   HENT:      Head: Normocephalic and atraumatic.   Neck:      Thyroid: No thyromegaly.      Vascular: Normal carotid pulses. No carotid bruit, hepatojugular reflux or JVD.      Trachea: No tracheal  deviation.   Pulmonary:      Effort: Pulmonary effort is normal. No respiratory distress.      Breath sounds: Normal breath sounds.   Chest:      Chest wall: Not tender to palpatation.   Cardiovascular:      Normal rate. Regular rhythm.      Murmurs: There is no murmur.      No gallop. No click. No rub.   Abdominal:      General: Bowel sounds are normal. There is no distension.      Palpations: Abdomen is soft.      Tenderness: There is no abdominal tenderness.   Musculoskeletal:         General: No deformity.      Cervical back: Normal range of motion and neck supple. Skin:     Findings: No erythema or rash.   Neurological:      Mental Status: Alert and oriented to person, place, and time.      Sensory: No sensory deficit.   Psychiatric:         Behavior: Behavior normal.           ECG 12 Lead    Date/Time: 6/23/2022 12:59 PM  Performed by: Tra Ashley MD  Authorized by: Tra Ashley MD   Comparison: compared with previous ECG from 12/1/2021  Similar to previous ECG  Rhythm: sinus rhythm  Rate: normal  QRS axis: normal                 Assessment:      No diagnosis found.  No orders of the defined types were placed in this encounter.    Plan:   76-year-old male with medical history of coronary artery disease, hypertension, hyperlipidemia and history of DVT and PE on chronic anticoagulant therapy presents back to me in follow-up.  He is doing very well but does complain that as of late his INR has been a little bit more unpredictable.  He did have some questions about Eliquis and we have discussed.    1. Coronary artery disease.  No angina.     -Continue aspirin 81 mg p.o. daily.  Tolerating this well with no bleeding complications.   -Continue metoprolol succinate at 25 mg p.o. daily.  No significant fatigue.  No resting bradycardia.   -Continue losartan 50 mg p.o. daily.  No hyperkalemia or renal insufficiency on that therapy.    2.   Hypertension.  His blood pressure looks great.  Continue  losartan and Toprol.    3.   Hyperlipidemia.  He is statin intolerant.  Continue Repatha.  Tolerating this well.    4.   History of DVT/PE.  Continue warfarin.  He will call me if he has continued issues with variability in his INR we will consider at that point time moving him over to Eliquis therapy.

## 2022-09-08 ENCOUNTER — TELEPHONE (OUTPATIENT)
Dept: GASTROENTEROLOGY | Facility: CLINIC | Age: 77
End: 2022-09-08

## 2022-09-09 ENCOUNTER — TELEPHONE (OUTPATIENT)
Dept: CARDIOLOGY | Facility: CLINIC | Age: 77
End: 2022-09-09

## 2022-09-09 NOTE — TELEPHONE ENCOUNTER
Patient is having a procedure on 9/14/2022 with Dr. Whitney. His office called today and asked if patient can hold his warfarin 3 mg for 4-5 days prior? They also faxed over a cardiac clearance form. They called yesterday and left a voicemail and wanted to follow up. I asked them to also re-send the clearance form and she said she would.    Dr. Whitney's office number is [phone]. Let me know if you need me to call them back.     Thank you,   Amairani Yi RN  [facility] Triage  09/09/22  13:22 EDT

## 2022-09-09 NOTE — TELEPHONE ENCOUNTER
Dr. Ashley, it sounds like he has is on warfarin for recurrent VTEs.  Do you think he needs bridged?

## 2022-09-09 NOTE — TELEPHONE ENCOUNTER
Yes I would recommend that he be bridged.  He can stop his warfarin 5 days prior.  He should have 3 days of bridging prior to the procedure and 3 days of bridging after the procedure

## 2022-09-09 NOTE — TELEPHONE ENCOUNTER
I spoke with the patient.  He has undergone this procedure several times before without bridging with lovenox and has never had any issues.  He would prefer not to bridge if possible.  I think it's fine for him to hold the warfarin starting today.  He should resume the warfarin at the surgeons discretion and follow up with his PCP for his INRs.

## 2022-09-12 ENCOUNTER — APPOINTMENT (OUTPATIENT)
Dept: LAB | Facility: SURGERY CENTER | Age: 77
End: 2022-09-12

## 2022-09-13 ENCOUNTER — ANESTHESIA EVENT (OUTPATIENT)
Dept: PERIOP | Facility: HOSPITAL | Age: 77
End: 2022-09-13

## 2022-09-14 ENCOUNTER — ANESTHESIA (OUTPATIENT)
Dept: PERIOP | Facility: HOSPITAL | Age: 77
End: 2022-09-14

## 2022-09-14 ENCOUNTER — HOSPITAL ENCOUNTER (OUTPATIENT)
Facility: HOSPITAL | Age: 77
Setting detail: HOSPITAL OUTPATIENT SURGERY
Discharge: HOME | End: 2022-09-14
Attending: INTERNAL MEDICINE | Admitting: INTERNAL MEDICINE

## 2022-09-14 VITALS
RESPIRATION RATE: 16 BRPM | SYSTOLIC BLOOD PRESSURE: 131 MMHG | DIASTOLIC BLOOD PRESSURE: 87 MMHG | BODY MASS INDEX: 31.48 KG/M2 | WEIGHT: 213.2 LBS | HEART RATE: 74 BPM | OXYGEN SATURATION: 95 % | TEMPERATURE: 97.6 F

## 2022-09-14 DIAGNOSIS — K22.70 BARRETT'S ESOPHAGUS WITHOUT DYSPLASIA: ICD-10-CM

## 2022-09-14 PROCEDURE — 88313 SPECIAL STAINS GROUP 2: CPT | Performed by: INTERNAL MEDICINE

## 2022-09-14 PROCEDURE — 25010000002 PROPOFOL 10 MG/ML EMULSION: Performed by: NURSE ANESTHETIST, CERTIFIED REGISTERED

## 2022-09-14 PROCEDURE — 88305 TISSUE EXAM BY PATHOLOGIST: CPT | Performed by: INTERNAL MEDICINE

## 2022-09-14 PROCEDURE — 43239 EGD BIOPSY SINGLE/MULTIPLE: CPT | Performed by: INTERNAL MEDICINE

## 2022-09-14 RX ORDER — SODIUM CHLORIDE 9 MG/ML
40 INJECTION, SOLUTION INTRAVENOUS AS NEEDED
Status: DISCONTINUED | OUTPATIENT
Start: 2022-09-14 | End: 2022-09-14 | Stop reason: HOSPADM

## 2022-09-14 RX ORDER — GLYCOPYRROLATE 0.2 MG/ML
INJECTION INTRAMUSCULAR; INTRAVENOUS AS NEEDED
Status: DISCONTINUED | OUTPATIENT
Start: 2022-09-14 | End: 2022-09-14 | Stop reason: SURG

## 2022-09-14 RX ORDER — SODIUM CHLORIDE, SODIUM LACTATE, POTASSIUM CHLORIDE, CALCIUM CHLORIDE 600; 310; 30; 20 MG/100ML; MG/100ML; MG/100ML; MG/100ML
9 INJECTION, SOLUTION INTRAVENOUS CONTINUOUS
Status: DISCONTINUED | OUTPATIENT
Start: 2022-09-14 | End: 2022-09-14 | Stop reason: HOSPADM

## 2022-09-14 RX ORDER — LIDOCAINE HYDROCHLORIDE 10 MG/ML
0.5 INJECTION, SOLUTION EPIDURAL; INFILTRATION; INTRACAUDAL; PERINEURAL ONCE AS NEEDED
Status: DISCONTINUED | OUTPATIENT
Start: 2022-09-14 | End: 2022-09-14 | Stop reason: HOSPADM

## 2022-09-14 RX ORDER — SODIUM CHLORIDE 0.9 % (FLUSH) 0.9 %
10 SYRINGE (ML) INJECTION EVERY 12 HOURS SCHEDULED
Status: DISCONTINUED | OUTPATIENT
Start: 2022-09-14 | End: 2022-09-14 | Stop reason: HOSPADM

## 2022-09-14 RX ORDER — SODIUM CHLORIDE, SODIUM LACTATE, POTASSIUM CHLORIDE, CALCIUM CHLORIDE 600; 310; 30; 20 MG/100ML; MG/100ML; MG/100ML; MG/100ML
30 INJECTION, SOLUTION INTRAVENOUS CONTINUOUS PRN
Status: DISCONTINUED | OUTPATIENT
Start: 2022-09-14 | End: 2022-09-14 | Stop reason: HOSPADM

## 2022-09-14 RX ORDER — LIDOCAINE HYDROCHLORIDE 20 MG/ML
INJECTION, SOLUTION INFILTRATION; PERINEURAL AS NEEDED
Status: DISCONTINUED | OUTPATIENT
Start: 2022-09-14 | End: 2022-09-14 | Stop reason: SURG

## 2022-09-14 RX ORDER — SODIUM CHLORIDE 0.9 % (FLUSH) 0.9 %
3 SYRINGE (ML) INJECTION EVERY 12 HOURS SCHEDULED
Status: DISCONTINUED | OUTPATIENT
Start: 2022-09-14 | End: 2022-09-14 | Stop reason: HOSPADM

## 2022-09-14 RX ORDER — SODIUM CHLORIDE 0.9 % (FLUSH) 0.9 %
10 SYRINGE (ML) INJECTION AS NEEDED
Status: DISCONTINUED | OUTPATIENT
Start: 2022-09-14 | End: 2022-09-14 | Stop reason: HOSPADM

## 2022-09-14 RX ORDER — PROPOFOL 10 MG/ML
VIAL (ML) INTRAVENOUS AS NEEDED
Status: DISCONTINUED | OUTPATIENT
Start: 2022-09-14 | End: 2022-09-14 | Stop reason: SURG

## 2022-09-14 RX ADMIN — SODIUM CHLORIDE, POTASSIUM CHLORIDE, SODIUM LACTATE AND CALCIUM CHLORIDE 9 ML/HR: 600; 310; 30; 20 INJECTION, SOLUTION INTRAVENOUS at 08:47

## 2022-09-14 RX ADMIN — PROPOFOL 100 MG: 10 INJECTION, EMULSION INTRAVENOUS at 09:35

## 2022-09-14 RX ADMIN — GLYCOPYRROLATE 0.2 MG: 0.2 INJECTION INTRAMUSCULAR; INTRAVENOUS at 09:35

## 2022-09-14 RX ADMIN — LIDOCAINE HYDROCHLORIDE 100 MG: 20 INJECTION, SOLUTION INFILTRATION; PERINEURAL at 09:35

## 2022-09-14 NOTE — ANESTHESIA PREPROCEDURE EVALUATION
Anesthesia Evaluation     Patient summary reviewed and Nursing notes reviewed   no history of anesthetic complications:  NPO Solid Status: > 8 hours  NPO Liquid Status: > 8 hours           Airway   Mallampati: II  TM distance: >3 FB  Neck ROM: full  No difficulty expected  Dental    (+) partials    Pulmonary - normal exam    breath sounds clear to auscultation  (+) pulmonary embolism (2011),   (-) recent URI  Cardiovascular - normal exam  Exercise tolerance: good (4-7 METS)    PT is on anticoagulation therapy  Rhythm: regular  Rate: normal    (+) hypertension, CAD, cardiac stents (2011) DVT (2011) resolved, hyperlipidemia,       Neuro/Psych- negative ROS  GI/Hepatic/Renal/Endo    (+) obesity,  GERD well controlled,      Musculoskeletal     (-) neck pain  Abdominal   (+) obese,    Substance History   (+) alcohol use (2 beers per day),      OB/GYN          Other   arthritis,      ROS/Med Hx Other: Coumadin last on 9/8/22                   Anesthesia Plan    ASA 2     MAC     intravenous induction     Anesthetic plan, risks, benefits, and alternatives have been provided, discussed and informed consent has been obtained with: patient.    Use of blood products discussed with patient  Consented to blood products.       CODE STATUS:

## 2022-09-14 NOTE — ANESTHESIA POSTPROCEDURE EVALUATION
Patient: Miguel Espitia    Procedure Summary     Date: 09/14/22 Room / Location: [facility] ENDOSCOPY 2 /  [facility]    Anesthesia Start: 0934 Anesthesia Stop: 0946    Procedure: ESOPHAGOGASTRODUODENOSCOPY (N/A ) Diagnosis:       Mendiola's esophagus without dysplasia      (Mendiola's esophagus without dysplasia [K22.70])    Surgeons: Avtar Whitney MD Provider: Dominic Beard CRNA    Anesthesia Type: MAC ASA Status: 2          Anesthesia Type: MAC    Vitals  No vitals data found for the desired time range.          Post Anesthesia Care and Evaluation    Patient location during evaluation: PHASE II  Patient participation: complete - patient participated  Level of consciousness: awake and alert  Pain score: 0  Pain management: adequate    Airway patency: patent  Anesthetic complications: No anesthetic complications  PONV Status: none  Cardiovascular status: acceptable  Respiratory status: acceptable  Hydration status: acceptable

## 2022-09-14 NOTE — H&P
No chief complaint on file.      HPI  Patient today for an EGD for follow-up of Mendiola's esophagus.         Problem List:    Patient Active Problem List   Diagnosis   • Mixed hyperlipidemia   • Essential hypertension   • Coronary artery disease involving native coronary artery of native heart without angina pectoris   • History of pulmonary embolus (PE)   • Mendiola's esophagus without dysplasia       Medical History:    Past Medical History:   Diagnosis Date   • Abdominal pain, left lower quadrant    • Abnormal glucose    • Abnormal urine findings    • Acute upper respiratory infection    • Anticoagulated on warfarin    • Atopic dermatitis    • Mendiola's esophagus    • Belching    • Benign enlargement of prostate    • Blood clotting disorder (HCC)    • Body aches    • Calcification of coronary artery    • Cough    • DVT, lower extremity (HCC)    • Esophageal reflux    • Gout    • Greater trochanteric bursitis of right hip    • History of colon polyps    • Hypercholesteremia    • Hyperglycemia    • Hypertension    • Hypertrophy of prostate     Benign   • Inability to attain erection    • Incidental pulmonary nodule    • Infected nailbed of toe, unspecified laterality    • Influenza B    • Left hip pain    • LLQ abdominal tenderness    • Low back pain    • Lumbar strain    • Molluscum contagiosum    • Neck pain    • Notalgia paresthetica    • Obesity    • Osteoarthritis    • Personal history of PE (pulmonary embolism)    • Pes anserinus bursitis of right knee    • Pulmonary nodule, right    • Renal cyst     Right   • Rheumatic fever    • Right hip pain    • Screen for colon cancer    • Shoulder pain, right    • Sore throat    • Stye    • Tinea versicolor    • TMJ arthralgia    • Trigger finger    • Venous thrombosis    • Vertigo    • Warfarin anticoagulation         Social History:    Social History     Socioeconomic History   • Marital status:    Tobacco Use   • Smoking status: Never Smoker   • Smokeless  tobacco: Never Used   • Tobacco comment: CAFFEINE USE: 1 CUP COFFEE DAILY   Vaping Use   • Vaping Use: Never used   Substance and Sexual Activity   • Alcohol use: Yes     Alcohol/week: 1.0 - 2.0 standard drink     Types: 1 - 2 Cans of beer per week   • Drug use: No   • Sexual activity: Defer       Family History:   Family History   Problem Relation Age of Onset   • No Known Problems Mother    • Throat cancer Father    • Arthritis Other    • Colon cancer Neg Hx    • Colon polyps Neg Hx    • Crohn's disease Neg Hx    • Irritable bowel syndrome Neg Hx    • Ulcerative colitis Neg Hx        Surgical History:   Past Surgical History:   Procedure Laterality Date   • CARDIAC CATHETERIZATION  11/04/2011    x1 stent at [facility]   • COLONOSCOPY     • CORONARY ANGIOPLASTY WITH STENT PLACEMENT  11/04/2011    The patient underwent successful angioplasty and stent to the marginal and 95% stenosis reduced to 0%.   • ENDOSCOPY     • HAND TENDON SURGERY      TENDON REPAIR RIGHT   • HERNIA REPAIR           Current Facility-Administered Medications:   •  lactated ringers infusion, 9 mL/hr, Intravenous, Continuous, Dominic Beard CRNA, Last Rate: 9 mL/hr at 09/14/22 0847, 9 mL/hr at 09/14/22 0847  •  lactated ringers infusion, 30 mL/hr, Intravenous, Continuous PRN, Colin, Kailyn G, APRN  •  lidocaine PF 1% (XYLOCAINE) injection 0.5 mL, 0.5 mL, Injection, Once PRN, Dominic Beard CRNA  •  sodium chloride 0.9 % flush 10 mL, 10 mL, Intravenous, PRN, Dominic Beard CRNA  •  sodium chloride 0.9 % flush 10 mL, 10 mL, Intravenous, Q12H, Dominic Beard CRNA  •  sodium chloride 0.9 % flush 10 mL, 10 mL, Intravenous, PRN, Colin, Kailyn G, APRN  •  sodium chloride 0.9 % flush 3 mL, 3 mL, Intravenous, Q12H, Colin, Kailyn G, APRN  •  sodium chloride 0.9 % infusion 40 mL, 40 mL, Intravenous, PRN, Dominic Beard CRNA    Allergies:   Allergies   Allergen Reactions   • Crestor [Rosuvastatin Calcium] Myalgia   • Pravastatin Sodium  Myalgia   • Statins Myalgia   • Codeine Rash          Review of Systems   Pertinent items are noted in HPI      The following portions of the patient's history were reviewed by me and updated as appropriate: review of systems, allergies, current medications, past family history, past medical history, past social history, past surgical history and problem list.    /79 (BP Location: Left arm, Patient Position: Lying)   Pulse 84   Temp 97.7 °F (36.5 °C) (Oral)   Resp 14   Wt 96.7 kg (213 lb 3.2 oz)   SpO2 94%   BMI 31.48 kg/m²     PHYSICAL EXAM:    CONSTITUTIONAL:  today's vital signs reviewed by me  GASTROINTESTINAL: abdomen is soft nontender nondistended with normal active bowel sounds, no masses are appreciated    Debilities: None  Emotional Behavior: Appropriate    Assessment/ Plan  We will proceed today with EGD with biopsy.    Risks and benefits as well as alternatives to endoscopic evaluation were explained to the patient and they voiced understanding and wish to proceed.  These risks include but are not limited to the risk of bleeding, perforation, adverse reaction to sedation, and missed lesions.  The patient was given the opportunity to ask questions prior to the endoscopic procedure.

## 2022-09-15 ENCOUNTER — OFFICE VISIT (OUTPATIENT)
Dept: CARDIOLOGY | Facility: CLINIC | Age: 77
End: 2022-09-15

## 2022-09-15 VITALS
BODY MASS INDEX: 31.4 KG/M2 | HEART RATE: 70 BPM | WEIGHT: 212 LBS | SYSTOLIC BLOOD PRESSURE: 118 MMHG | HEIGHT: 69 IN | DIASTOLIC BLOOD PRESSURE: 78 MMHG

## 2022-09-15 DIAGNOSIS — Z86.711 HISTORY OF PULMONARY EMBOLUS (PE): ICD-10-CM

## 2022-09-15 DIAGNOSIS — I25.119 CORONARY ARTERY DISEASE INVOLVING NATIVE CORONARY ARTERY OF NATIVE HEART WITH ANGINA PECTORIS: Primary | ICD-10-CM

## 2022-09-15 PROCEDURE — 93000 ELECTROCARDIOGRAM COMPLETE: CPT | Performed by: NURSE PRACTITIONER

## 2022-09-15 PROCEDURE — 99214 OFFICE O/P EST MOD 30 MIN: CPT | Performed by: NURSE PRACTITIONER

## 2022-09-15 NOTE — PROGRESS NOTES
Date of Office Visit: 09/15/2022  Encounter Provider: MONTEZ Cano  Place of Service: [facility]  Patient Name: Miguel Espitia  :1945    Chief Complaint   Patient presents with   • Coronary Artery Disease   :     HPI: Miguel Espitia is a 77 y.o. male.   He is a patient of Dr. Ashley's whom we follow for the management of hypertension, hyperlipidemia, and coronary artery disease.  In , he underwent successful angioplasty and stenting of the marginal.  He also has a history of DVT and PE and has been anticoagulated on warfarin.   He was last seen in the office by Dr. Ashley on  at which time he was overall doing well.  He reported occasionally having to take a deep breath when walking but denied any overt shortness of breath.  He also reported significant variability in his INR.  There was discussion of possibly transitioning him to Eliquis if this were to continue.  Otherwise, he was advised to follow-up in 6 months.   On , he underwent an EGD for follow-up of Mendiola's esophagus.  In preparation for the procedure, he held his warfarin 5 days prior.   For the last 2 to 4 weeks, he reports exertional chest tightness.  It resolves within minutes of rest.  He has also been noticing increased fatigue and not being able to walk as far as he wants good.  He does report similar issues prior to his previous stent placement.  He denies any shortness of breath, palpitations, edema, dizziness, syncope, bleeding difficulties or melena.  He is back on warfarin as of last night.    Past Medical History:   Diagnosis Date   • Abdominal pain, left lower quadrant    • Abnormal glucose    • Abnormal urine findings    • Acute upper respiratory infection    • Anticoagulated on warfarin    • Atopic dermatitis    • Mendiola's esophagus    • Belching    • Benign enlargement of prostate    • Blood clotting disorder (HCC)    • Body aches    • Calcification of coronary  artery    • Cough    • DVT, lower extremity (HCC)    • Esophageal reflux    • Gout    • Greater trochanteric bursitis of right hip    • History of colon polyps    • Hypercholesteremia    • Hyperglycemia    • Hypertension    • Hypertrophy of prostate     Benign   • Inability to attain erection    • Incidental pulmonary nodule    • Infected nailbed of toe, unspecified laterality    • Influenza B    • Left hip pain    • LLQ abdominal tenderness    • Low back pain    • Lumbar strain    • Molluscum contagiosum    • Neck pain    • Notalgia paresthetica    • Obesity    • Osteoarthritis    • Personal history of PE (pulmonary embolism)    • Pes anserinus bursitis of right knee    • Pulmonary nodule, right    • Renal cyst     Right   • Rheumatic fever    • Right hip pain    • Screen for colon cancer    • Shoulder pain, right    • Sore throat    • Stye    • Tinea versicolor    • TMJ arthralgia    • Trigger finger    • Venous thrombosis    • Vertigo    • Warfarin anticoagulation        Past Surgical History:   Procedure Laterality Date   • CARDIAC CATHETERIZATION  11/04/2011    x1 stent at [facility]   • COLONOSCOPY     • CORONARY ANGIOPLASTY WITH STENT PLACEMENT  11/04/2011    The patient underwent successful angioplasty and stent to the marginal and 95% stenosis reduced to 0%.   • ENDOSCOPY     • ENDOSCOPY N/A 9/14/2022    Procedure: ESOPHAGOGASTRODUODENOSCOPY;  Surgeon: Avtar Whitney MD;  Location: [facility];  Service: Gastroenterology;  Laterality: N/A;  IRREGULAR Z LINE FOR HIATAL HERNIA   • HAND TENDON SURGERY      TENDON REPAIR RIGHT   • HERNIA REPAIR         Social History     Socioeconomic History   • Marital status:    Tobacco Use   • Smoking status: Never Smoker   • Smokeless tobacco: Never Used   • Tobacco comment: CAFFEINE USE: 1 CUP COFFEE DAILY   Vaping Use   • Vaping Use: Never used   Substance and Sexual Activity   • Alcohol use: Yes     Alcohol/week: 1.0 - 2.0 standard drink     Types: 1 - 2  "Cans of beer per week   • Drug use: No   • Sexual activity: Defer       Family History   Problem Relation Age of Onset   • No Known Problems Mother    • Throat cancer Father    • Arthritis Other    • Colon cancer Neg Hx    • Colon polyps Neg Hx    • Crohn's disease Neg Hx    • Irritable bowel syndrome Neg Hx    • Ulcerative colitis Neg Hx        Review of Systems   Constitutional: Positive for malaise/fatigue.   Cardiovascular: Positive for chest pain. Negative for dyspnea on exertion, leg swelling, orthopnea, paroxysmal nocturnal dyspnea and syncope.   Respiratory: Negative.    Hematologic/Lymphatic: Negative for bleeding problem.   Musculoskeletal: Negative for falls.   Gastrointestinal: Negative for melena.   Neurological: Negative for dizziness and light-headedness.       Allergies   Allergen Reactions   • Crestor [Rosuvastatin Calcium] Myalgia   • Pravastatin Sodium Myalgia   • Statins Myalgia   • Codeine Rash         Current Outpatient Medications:   •  allopurinol (ZYLOPRIM) 100 MG tablet, Take 100 mg by mouth 2 (Two) Times a Day., Disp: , Rfl:   •  aspirin 81 MG EC tablet, Take 81 mg by mouth Daily., Disp: , Rfl:   •  dutasteride (AVODART) 0.5 MG capsule, , Disp: , Rfl:   •  losartan (COZAAR) 50 MG tablet, Take 50 mg by mouth Daily., Disp: , Rfl: 5  •  metoprolol succinate XL (TOPROL-XL) 25 MG 24 hr tablet, Take 0.5 tablets by mouth Daily., Disp: , Rfl: 0  •  Repatha SureClick solution auto-injector SureClick injection, INJECT 1 ML UNDER THE SKIN EVERY 14 DAYS, Disp: 2 mL, Rfl: 11  •  warfarin (COUMADIN) 4 MG tablet, Take 4 mg by mouth Daily., Disp: , Rfl:   No current facility-administered medications for this visit.      Objective:     Vitals:    09/15/22 1224   BP: 118/78   Pulse: 70   Weight: 96.2 kg (212 lb)   Height: 175.3 cm (69\")     Body mass index is 31.31 kg/m².    PHYSICAL EXAM:    Neck:      Vascular: No JVD.   Pulmonary:      Effort: Pulmonary effort is normal.      Breath sounds: Normal breath " sounds.   Cardiovascular:      Normal rate. Regular rhythm.      Murmurs: There is no murmur.      No gallop. No click. No rub.   Pulses:     Intact distal pulses.           ECG 12 Lead    Date/Time: 9/15/2022 12:41 PM  Performed by: Lucia Oswald APRN  Authorized by: Lucia Oswald APRN   Comparison: compared with previous ECG from 6/23/2022  Similar to previous ECG  Rhythm: sinus rhythm  Rate: normal  BPM: 70  Q waves: III, II and aVF                  Assessment:       Diagnosis Plan   1. Coronary artery disease involving native coronary artery of native heart with angina pectoris (HCC)  ECG 12 Lead    Stress Test With Myocardial Perfusion One Day   2. History of pulmonary embolus (PE)       Orders Placed This Encounter   Procedures   • Stress Test With Myocardial Perfusion One Day     Standing Status:   Future     Standing Expiration Date:   9/15/2023     Order Specific Question:   What stress agent will be used?     Answer:   Exercise with possible pharmacologic     Order Specific Question:   Reason for exam?     Answer:   Chest Pain     Order Specific Question:   Reason for exam?     Answer:   Known Coronary Artery Disease   • ECG 12 Lead     This order was created via procedure documentation     Order Specific Question:   Release to patient     Answer:   Routine Release          Plan:       1.  Coronary artery disease.  History of PCI of the marginal in 2011.  His EKG is nonischemic.  However, he is reporting exertional chest tightness similar to his previous symptoms.  I think this warrants further evaluation, and I have ordered a nuclear stress test.      2.  History of pulmonary embolus and recurrent DVT.  He is anticoagulated on warfarin.      Further recommendations will be made pending the results of the stress test.      As always, it has been a pleasure to participate in your patient's care.      Sincerely,         MONTEZ Rivera

## 2022-09-18 LAB
LAB AP CASE REPORT: NORMAL
LAB AP DIAGNOSIS COMMENT: NORMAL
PATH REPORT.ADDENDUM SPEC: NORMAL
PATH REPORT.FINAL DX SPEC: NORMAL
PATH REPORT.GROSS SPEC: NORMAL

## 2022-09-20 ENCOUNTER — TELEPHONE (OUTPATIENT)
Dept: GASTROENTEROLOGY | Facility: CLINIC | Age: 77
End: 2022-09-20

## 2022-09-22 ENCOUNTER — TELEPHONE (OUTPATIENT)
Dept: CARDIOLOGY | Facility: CLINIC | Age: 77
End: 2022-09-22

## 2022-09-22 NOTE — TELEPHONE ENCOUNTER
Pt called. He would like to speak with you regarding his upcomming Nuclear Stress Test on 9/26/22.    He mentioned that he is on warfarin and was concerned about needing to stop it before his stress test. He was worried if they needed to do a heart cath it might be delayed due to him still being on the warfarin.    He can be reached at #[phone].    Thanks,  Sylvie

## 2022-09-23 NOTE — TELEPHONE ENCOUNTER
Notified patient of recommendations. Patient verbalized understanding.    Carlyn Garduno RN  Triage [facility] [place]

## 2022-09-23 NOTE — TELEPHONE ENCOUNTER
Please let him know he will not need to hold the warfarin prior to the stress test.  We will address the anticoagulation needs pending the results of his stress test.  Given his history, Dr. Ashley may even be willing to perform the cath (if one is needed) while on the warfarin.

## 2022-09-26 ENCOUNTER — HOSPITAL ENCOUNTER (OUTPATIENT)
Dept: CARDIOLOGY | Facility: HOSPITAL | Age: 77
Discharge: HOME | End: 2022-09-26
Admitting: NURSE PRACTITIONER

## 2022-09-26 ENCOUNTER — TELEPHONE (OUTPATIENT)
Dept: CARDIOLOGY | Facility: CLINIC | Age: 77
End: 2022-09-26

## 2022-09-26 ENCOUNTER — TRANSCRIBE ORDERS (OUTPATIENT)
Dept: CARDIOLOGY | Facility: CLINIC | Age: 77
End: 2022-09-26

## 2022-09-26 VITALS — BODY MASS INDEX: 31.67 KG/M2 | WEIGHT: 213.85 LBS | HEIGHT: 69 IN

## 2022-09-26 DIAGNOSIS — I25.119 CORONARY ARTERY DISEASE INVOLVING NATIVE CORONARY ARTERY OF NATIVE HEART WITH ANGINA PECTORIS: Primary | ICD-10-CM

## 2022-09-26 DIAGNOSIS — I25.119 CORONARY ARTERY DISEASE INVOLVING NATIVE CORONARY ARTERY OF NATIVE HEART WITH ANGINA PECTORIS: ICD-10-CM

## 2022-09-26 DIAGNOSIS — Z13.6 SCREENING FOR ISCHEMIC HEART DISEASE: ICD-10-CM

## 2022-09-26 DIAGNOSIS — Z01.810 PRE-OPERATIVE CARDIOVASCULAR EXAMINATION: Primary | ICD-10-CM

## 2022-09-26 LAB
BH CV NUCLEAR PRIOR STUDY: 2
BH CV REST NUCLEAR ISOTOPE DOSE: 38 MCI
BH CV STRESS BP STAGE 1: NORMAL
BH CV STRESS COMMENTS STAGE 1: NORMAL
BH CV STRESS DOSE REGADENOSON STAGE 1: 0.4
BH CV STRESS DURATION MIN STAGE 1: 0
BH CV STRESS DURATION SEC STAGE 1: 10
BH CV STRESS HR STAGE 1: 86
BH CV STRESS NUCLEAR ISOTOPE DOSE: 38 MCI
BH CV STRESS PROTOCOL 1: NORMAL
BH CV STRESS RECOVERY BP: NORMAL MMHG
BH CV STRESS RECOVERY HR: 71 BPM
BH CV STRESS STAGE 1: 1
LV EF NUC BP: 63 %
MAXIMAL PREDICTED HEART RATE: 145 BPM
PERCENT MAX PREDICTED HR: 59.31 %
STRESS BASELINE BP: NORMAL MMHG
STRESS BASELINE HR: 65 BPM
STRESS PERCENT HR: 70 %
STRESS POST EXERCISE DUR SEC: 10 SEC
STRESS POST PEAK BP: NORMAL MMHG
STRESS POST PEAK HR: 86 BPM
STRESS TARGET HR: 123 BPM

## 2022-09-26 PROCEDURE — 25010000002 REGADENOSON 0.4 MG/5ML SOLUTION: Performed by: NURSE PRACTITIONER

## 2022-09-26 PROCEDURE — 93016 CV STRESS TEST SUPVJ ONLY: CPT | Performed by: INTERNAL MEDICINE

## 2022-09-26 PROCEDURE — 93018 CV STRESS TEST I&R ONLY: CPT | Performed by: INTERNAL MEDICINE

## 2022-09-26 PROCEDURE — 0 RUBIDIUM CHLORIDE: Performed by: NURSE PRACTITIONER

## 2022-09-26 PROCEDURE — 93017 CV STRESS TEST TRACING ONLY: CPT

## 2022-09-26 PROCEDURE — 78492 MYOCRD IMG PET MLT RST&STRS: CPT

## 2022-09-26 PROCEDURE — A9555 RB82 RUBIDIUM: HCPCS | Performed by: NURSE PRACTITIONER

## 2022-09-26 PROCEDURE — 78492 MYOCRD IMG PET MLT RST&STRS: CPT | Performed by: INTERNAL MEDICINE

## 2022-09-26 RX ORDER — NITROGLYCERIN 0.4 MG/1
TABLET SUBLINGUAL
Qty: 100 TABLET | Refills: 11 | Status: SHIPPED | OUTPATIENT
Start: 2022-09-26

## 2022-09-26 RX ADMIN — REGADENOSON 0.4 MG: 0.08 INJECTION, SOLUTION INTRAVENOUS at 07:57

## 2022-09-26 NOTE — TELEPHONE ENCOUNTER
I spoke to the patient regarding his abnormal stress test.  He needs a heart cath, and will also need to hold the warfarin 3 days prior.  Dr. Ashley had recommended scheduling one next Tuesday.  However, the patient does not want to wait that long.    Meghna, please schedule cardiac catheterization on Thursday or Friday with an alternate provider.  He is aware to hold the warfarin 3 days prior.

## 2022-09-27 ENCOUNTER — LAB (OUTPATIENT)
Dept: LAB | Facility: HOSPITAL | Age: 77
End: 2022-09-27

## 2022-09-27 DIAGNOSIS — Z13.6 SCREENING FOR ISCHEMIC HEART DISEASE: ICD-10-CM

## 2022-09-27 DIAGNOSIS — Z01.810 PRE-OPERATIVE CARDIOVASCULAR EXAMINATION: ICD-10-CM

## 2022-09-27 LAB
ANION GAP SERPL CALCULATED.3IONS-SCNC: 7.3 MMOL/L (ref 5–15)
BASOPHILS # BLD AUTO: 0.06 10*3/MM3 (ref 0–0.2)
BASOPHILS NFR BLD AUTO: 0.8 % (ref 0–1.5)
BUN SERPL-MCNC: 21 MG/DL (ref 8–23)
BUN/CREAT SERPL: 16.2 (ref 7–25)
CALCIUM SPEC-SCNC: 9.2 MG/DL (ref 8.6–10.5)
CHLORIDE SERPL-SCNC: 108 MMOL/L (ref 98–107)
CO2 SERPL-SCNC: 25.7 MMOL/L (ref 22–29)
CREAT SERPL-MCNC: 1.3 MG/DL (ref 0.76–1.27)
DEPRECATED RDW RBC AUTO: 48.9 FL (ref 37–54)
EGFRCR SERPLBLD CKD-EPI 2021: 57.3 ML/MIN/1.73
EOSINOPHIL # BLD AUTO: 0.14 10*3/MM3 (ref 0–0.4)
EOSINOPHIL NFR BLD AUTO: 1.9 % (ref 0.3–6.2)
ERYTHROCYTE [DISTWIDTH] IN BLOOD BY AUTOMATED COUNT: 13.4 % (ref 12.3–15.4)
GLUCOSE SERPL-MCNC: 143 MG/DL (ref 65–99)
HCT VFR BLD AUTO: 47.7 % (ref 37.5–51)
HGB BLD-MCNC: 15.9 G/DL (ref 13–17.7)
IMM GRANULOCYTES # BLD AUTO: 0.01 10*3/MM3 (ref 0–0.05)
IMM GRANULOCYTES NFR BLD AUTO: 0.1 % (ref 0–0.5)
LYMPHOCYTES # BLD AUTO: 2.23 10*3/MM3 (ref 0.7–3.1)
LYMPHOCYTES NFR BLD AUTO: 30.1 % (ref 19.6–45.3)
MCH RBC QN AUTO: 33.1 PG (ref 26.6–33)
MCHC RBC AUTO-ENTMCNC: 33.3 G/DL (ref 31.5–35.7)
MCV RBC AUTO: 99.2 FL (ref 79–97)
MONOCYTES # BLD AUTO: 0.65 10*3/MM3 (ref 0.1–0.9)
MONOCYTES NFR BLD AUTO: 8.8 % (ref 5–12)
NEUTROPHILS NFR BLD AUTO: 4.33 10*3/MM3 (ref 1.7–7)
NEUTROPHILS NFR BLD AUTO: 58.3 % (ref 42.7–76)
NRBC BLD AUTO-RTO: 0 /100 WBC (ref 0–0.2)
PLATELET # BLD AUTO: 169 10*3/MM3 (ref 140–450)
PMV BLD AUTO: 9.9 FL (ref 6–12)
POTASSIUM SERPL-SCNC: 4.2 MMOL/L (ref 3.5–5.2)
RBC # BLD AUTO: 4.81 10*6/MM3 (ref 4.14–5.8)
SODIUM SERPL-SCNC: 141 MMOL/L (ref 136–145)
WBC NRBC COR # BLD: 7.42 10*3/MM3 (ref 3.4–10.8)

## 2022-09-27 PROCEDURE — 80048 BASIC METABOLIC PNL TOTAL CA: CPT

## 2022-09-27 PROCEDURE — 36415 COLL VENOUS BLD VENIPUNCTURE: CPT

## 2022-09-27 PROCEDURE — 85025 COMPLETE CBC W/AUTO DIFF WBC: CPT

## 2022-09-29 ENCOUNTER — HOSPITAL ENCOUNTER (OUTPATIENT)
Facility: HOSPITAL | Age: 77
Setting detail: HOSPITAL OUTPATIENT SURGERY
Discharge: HOME | End: 2022-09-29
Attending: STUDENT IN AN ORGANIZED HEALTH CARE EDUCATION/TRAINING PROGRAM | Admitting: STUDENT IN AN ORGANIZED HEALTH CARE EDUCATION/TRAINING PROGRAM

## 2022-09-29 VITALS
BODY MASS INDEX: 31.1 KG/M2 | OXYGEN SATURATION: 94 % | TEMPERATURE: 97.8 F | SYSTOLIC BLOOD PRESSURE: 136 MMHG | WEIGHT: 210 LBS | HEART RATE: 59 BPM | RESPIRATION RATE: 20 BRPM | DIASTOLIC BLOOD PRESSURE: 83 MMHG | HEIGHT: 69 IN

## 2022-09-29 DIAGNOSIS — I25.10 CORONARY ARTERY DISEASE INVOLVING NATIVE CORONARY ARTERY OF NATIVE HEART WITHOUT ANGINA PECTORIS: Primary | ICD-10-CM

## 2022-09-29 DIAGNOSIS — I25.119 CORONARY ARTERY DISEASE INVOLVING NATIVE CORONARY ARTERY OF NATIVE HEART WITH ANGINA PECTORIS: ICD-10-CM

## 2022-09-29 LAB
INR PPP: 1.6 (ref 0.8–1.2)
PROTHROMBIN TIME: 19 SECONDS (ref 12.8–15.2)
QT INTERVAL: 413 MS

## 2022-09-29 PROCEDURE — 25010000002 FENTANYL CITRATE (PF) 50 MCG/ML SOLUTION: Performed by: STUDENT IN AN ORGANIZED HEALTH CARE EDUCATION/TRAINING PROGRAM

## 2022-09-29 PROCEDURE — 85610 PROTHROMBIN TIME: CPT

## 2022-09-29 PROCEDURE — S0260 H&P FOR SURGERY: HCPCS | Performed by: STUDENT IN AN ORGANIZED HEALTH CARE EDUCATION/TRAINING PROGRAM

## 2022-09-29 PROCEDURE — 25010000002 HEPARIN (PORCINE) PER 1000 UNITS: Performed by: STUDENT IN AN ORGANIZED HEALTH CARE EDUCATION/TRAINING PROGRAM

## 2022-09-29 PROCEDURE — 92921: CPT | Performed by: STUDENT IN AN ORGANIZED HEALTH CARE EDUCATION/TRAINING PROGRAM

## 2022-09-29 PROCEDURE — C1769 GUIDE WIRE: HCPCS | Performed by: STUDENT IN AN ORGANIZED HEALTH CARE EDUCATION/TRAINING PROGRAM

## 2022-09-29 PROCEDURE — C1894 INTRO/SHEATH, NON-LASER: HCPCS | Performed by: STUDENT IN AN ORGANIZED HEALTH CARE EDUCATION/TRAINING PROGRAM

## 2022-09-29 PROCEDURE — 92921 PR PRQ TRLUML CORONARY ANGIOPLASTY ADDL BRANCH: CPT | Performed by: STUDENT IN AN ORGANIZED HEALTH CARE EDUCATION/TRAINING PROGRAM

## 2022-09-29 PROCEDURE — 93458 L HRT ARTERY/VENTRICLE ANGIO: CPT | Performed by: STUDENT IN AN ORGANIZED HEALTH CARE EDUCATION/TRAINING PROGRAM

## 2022-09-29 PROCEDURE — 93005 ELECTROCARDIOGRAM TRACING: CPT | Performed by: STUDENT IN AN ORGANIZED HEALTH CARE EDUCATION/TRAINING PROGRAM

## 2022-09-29 PROCEDURE — 25010000002 MIDAZOLAM PER 1 MG: Performed by: STUDENT IN AN ORGANIZED HEALTH CARE EDUCATION/TRAINING PROGRAM

## 2022-09-29 PROCEDURE — 93010 ELECTROCARDIOGRAM REPORT: CPT | Performed by: INTERNAL MEDICINE

## 2022-09-29 PROCEDURE — 0 IOPAMIDOL PER 1 ML: Performed by: STUDENT IN AN ORGANIZED HEALTH CARE EDUCATION/TRAINING PROGRAM

## 2022-09-29 PROCEDURE — 92928 PRQ TCAT PLMT NTRAC ST 1 LES: CPT | Performed by: STUDENT IN AN ORGANIZED HEALTH CARE EDUCATION/TRAINING PROGRAM

## 2022-09-29 PROCEDURE — C1874 STENT, COATED/COV W/DEL SYS: HCPCS | Performed by: STUDENT IN AN ORGANIZED HEALTH CARE EDUCATION/TRAINING PROGRAM

## 2022-09-29 PROCEDURE — C1725 CATH, TRANSLUMIN NON-LASER: HCPCS | Performed by: STUDENT IN AN ORGANIZED HEALTH CARE EDUCATION/TRAINING PROGRAM

## 2022-09-29 PROCEDURE — C9600 PERC DRUG-EL COR STENT SING: HCPCS | Performed by: STUDENT IN AN ORGANIZED HEALTH CARE EDUCATION/TRAINING PROGRAM

## 2022-09-29 PROCEDURE — C1887 CATHETER, GUIDING: HCPCS | Performed by: STUDENT IN AN ORGANIZED HEALTH CARE EDUCATION/TRAINING PROGRAM

## 2022-09-29 PROCEDURE — 99153 MOD SED SAME PHYS/QHP EA: CPT | Performed by: STUDENT IN AN ORGANIZED HEALTH CARE EDUCATION/TRAINING PROGRAM

## 2022-09-29 PROCEDURE — 99152 MOD SED SAME PHYS/QHP 5/>YRS: CPT | Performed by: STUDENT IN AN ORGANIZED HEALTH CARE EDUCATION/TRAINING PROGRAM

## 2022-09-29 PROCEDURE — 85347 COAGULATION TIME ACTIVATED: CPT

## 2022-09-29 DEVICE — XIENCE SKYPOINT™ EVEROLIMUS ELUTING CORONARY STENT SYSTEM 3.00 MM X 28 MM / RAPID-EXCHANGE
Type: IMPLANTABLE DEVICE | Status: FUNCTIONAL
Brand: XIENCE SKYPOINT™

## 2022-09-29 RX ORDER — SODIUM CHLORIDE 0.9 % (FLUSH) 0.9 %
10 SYRINGE (ML) INJECTION EVERY 12 HOURS SCHEDULED
Status: DISCONTINUED | OUTPATIENT
Start: 2022-09-29 | End: 2022-09-29 | Stop reason: HOSPADM

## 2022-09-29 RX ORDER — MIDAZOLAM HYDROCHLORIDE 1 MG/ML
INJECTION INTRAMUSCULAR; INTRAVENOUS AS NEEDED
Status: DISCONTINUED | OUTPATIENT
Start: 2022-09-29 | End: 2022-09-29 | Stop reason: HOSPADM

## 2022-09-29 RX ORDER — DIPHENOXYLATE HYDROCHLORIDE AND ATROPINE SULFATE 2.5; .025 MG/1; MG/1
1 TABLET ORAL DAILY
COMMUNITY

## 2022-09-29 RX ORDER — SODIUM CHLORIDE 0.9 % (FLUSH) 0.9 %
10 SYRINGE (ML) INJECTION AS NEEDED
Status: DISCONTINUED | OUTPATIENT
Start: 2022-09-29 | End: 2022-09-29 | Stop reason: HOSPADM

## 2022-09-29 RX ORDER — CLOPIDOGREL BISULFATE 75 MG/1
TABLET ORAL AS NEEDED
Status: DISCONTINUED | OUTPATIENT
Start: 2022-09-29 | End: 2022-09-29 | Stop reason: HOSPADM

## 2022-09-29 RX ORDER — CLOPIDOGREL BISULFATE 75 MG/1
75 TABLET ORAL DAILY
Qty: 90 TABLET | Refills: 3 | Status: SHIPPED | OUTPATIENT
Start: 2022-09-29

## 2022-09-29 RX ORDER — SODIUM CHLORIDE 9 MG/ML
75 INJECTION, SOLUTION INTRAVENOUS CONTINUOUS
Status: DISCONTINUED | OUTPATIENT
Start: 2022-09-29 | End: 2022-09-29 | Stop reason: HOSPADM

## 2022-09-29 RX ORDER — ACETAMINOPHEN 325 MG/1
650 TABLET ORAL EVERY 4 HOURS PRN
Status: DISCONTINUED | OUTPATIENT
Start: 2022-09-29 | End: 2022-09-29 | Stop reason: HOSPADM

## 2022-09-29 RX ORDER — FENTANYL CITRATE 50 UG/ML
INJECTION, SOLUTION INTRAMUSCULAR; INTRAVENOUS AS NEEDED
Status: DISCONTINUED | OUTPATIENT
Start: 2022-09-29 | End: 2022-09-29 | Stop reason: HOSPADM

## 2022-09-29 RX ORDER — OMEPRAZOLE 20 MG/1
20 CAPSULE, DELAYED RELEASE ORAL DAILY
COMMUNITY
End: 2022-10-03

## 2022-09-29 RX ORDER — LIDOCAINE HYDROCHLORIDE 20 MG/ML
INJECTION, SOLUTION INFILTRATION; PERINEURAL AS NEEDED
Status: DISCONTINUED | OUTPATIENT
Start: 2022-09-29 | End: 2022-09-29 | Stop reason: HOSPADM

## 2022-09-29 RX ORDER — HEPARIN SODIUM 1000 [USP'U]/ML
INJECTION, SOLUTION INTRAVENOUS; SUBCUTANEOUS AS NEEDED
Status: DISCONTINUED | OUTPATIENT
Start: 2022-09-29 | End: 2022-09-29 | Stop reason: HOSPADM

## 2022-09-29 RX ADMIN — SODIUM CHLORIDE 75 ML/HR: 9 INJECTION, SOLUTION INTRAVENOUS at 07:47

## 2022-09-30 ENCOUNTER — TELEPHONE (OUTPATIENT)
Dept: GASTROENTEROLOGY | Facility: CLINIC | Age: 77
End: 2022-09-30

## 2022-09-30 LAB — ACT BLD: 324 SECONDS (ref 82–152)

## 2022-10-03 RX ORDER — PANTOPRAZOLE SODIUM 20 MG/1
20 TABLET, DELAYED RELEASE ORAL DAILY
Qty: 30 TABLET | Refills: 5 | Status: SHIPPED | OUTPATIENT
Start: 2022-10-03 | End: 2023-03-27

## 2022-10-05 NOTE — PROGRESS NOTES
Date of Office Visit: 10/06/2022  Encounter Provider: MONTEZ Stuart  Place of Service: [facility] CARDIOLOG[facility]  Patient Name: Miguel Espitia  :1945    Chief Complaint   Patient presents with   • Coronary Artery Disease   • Hospital Follow Up Visit   :     HPI: Miguel Espitia is a 77 y.o. male who is a patient of Dr. Ashley.  He is new to me today and presents for a 1 week hospital follow-up.  He has a history of coronary artery disease status post prior PCI to OM1 in , hyperlipidemia, DVT/PE on warfarin.  He presented to [facility] for an elective cardiac cath on 2022 after an abnormal stress test.  Stress test was performed due to progressive dyspnea on exertion and angina.    Cardiac cath showed 99% proximal lesion in the LAD and 70% D1 lesion.  Stent in OM1 was patent.  He underwent successful PCI with RAISSA to proximal LAD and balloon angioplasty to severe D1 lesion.  He was started on Plavix and warfarin was restarted that evening.  Patient will continue on triple therapy with aspirin, Plavix and warfarin for 1 month then DC aspirin and continue Plavix and warfarin.    Patient was concerned taking Prilosec and Plavix due to FDA warnings.  He spoke with GI doctor and was changed to Protonix.    Today patient presents with no complaints.  He says every now and then he has a little bit of shortness of breath and feels like he needs to take a deep breath.  He notes that this is likely due to his hiatal hernia.  He denies any chest pain, palpitations, edema or lightheadedness.  He plans to enroll in cardiac rehab.  He has been compliant with all of his medications.  Blood pressures well controlled.  EKG normal.  His lipid panel last year was in target range on Repatha.  He will have another lipid panel drawn in the next week or so.  Remains on triple therapy with aspirin, Plavix and warfarin.  Patient has been walking in his neighborhood  with no issues.  Right radial site no issues.    Previous testing and notes have been reviewed by me.   Past Medical History:   Diagnosis Date   • Abdominal pain, left lower quadrant    • Abnormal glucose    • Abnormal urine findings    • Acute upper respiratory infection    • Anticoagulated on warfarin    • Atopic dermatitis    • Mendiola's esophagus    • Belching    • Benign enlargement of prostate    • Blood clotting disorder (HCC)    • Body aches    • Calcification of coronary artery    • Cough    • DVT, lower extremity (HCC)    • Esophageal reflux    • Gout    • Greater trochanteric bursitis of right hip    • History of colon polyps    • Hypercholesteremia    • Hyperglycemia    • Hypertension    • Hypertrophy of prostate     Benign   • Inability to attain erection    • Incidental pulmonary nodule    • Infected nailbed of toe, unspecified laterality    • Influenza B    • Left hip pain    • LLQ abdominal tenderness    • Low back pain    • Lumbar strain    • Molluscum contagiosum    • Neck pain    • Notalgia paresthetica    • Obesity    • Osteoarthritis    • Personal history of PE (pulmonary embolism)    • Pes anserinus bursitis of right knee    • Pulmonary nodule, right    • Renal cyst     Right   • Rheumatic fever    • Right hip pain    • Screen for colon cancer    • Shoulder pain, right    • Sore throat    • Stye    • Tinea versicolor    • TMJ arthralgia    • Trigger finger    • Venous thrombosis    • Vertigo    • Warfarin anticoagulation        Past Surgical History:   Procedure Laterality Date   • CARDIAC CATHETERIZATION  11/04/2011    x1 stent at [facility]   • CARDIAC CATHETERIZATION N/A 9/29/2022    Procedure: Coronary angiography;  Surgeon: Mckinley Wall MD;  Location:  [facility];  Service: Cardiology;  Laterality: N/A;   • CARDIAC CATHETERIZATION N/A 9/29/2022    Procedure: Left Heart Cath;  Surgeon: Mckinley Wall MD;  Location:  [facility];  Service: Cardiology;   Laterality: N/A;   • CARDIAC CATHETERIZATION N/A 9/29/2022    Procedure: Stent RAISSA coronary;  Surgeon: Mckinley Wall MD;  Location:  [facility];  Service: Cardiology;  Laterality: N/A;   • CARDIAC CATHETERIZATION N/A 9/29/2022    Procedure: Percutaneous Coronary Intervention;  Surgeon: Mckinley Wall MD;  Location:  [facility];  Service: Cardiology;  Laterality: N/A;   • COLONOSCOPY     • CORONARY ANGIOPLASTY WITH STENT PLACEMENT  11/04/2011    The patient underwent successful angioplasty and stent to the marginal and 95% stenosis reduced to 0%.   • ENDOSCOPY     • ENDOSCOPY N/A 9/14/2022    Procedure: ESOPHAGOGASTRODUODENOSCOPY;  Surgeon: Avtar Whitney MD;  Location:  [facility];  Service: Gastroenterology;  Laterality: N/A;  IRREGULAR Z LINE FOR HIATAL HERNIA   • HAND TENDON SURGERY      TENDON REPAIR RIGHT   • HERNIA REPAIR         Social History     Socioeconomic History   • Marital status:    Tobacco Use   • Smoking status: Never Smoker   • Smokeless tobacco: Never Used   • Tobacco comment: CAFFEINE USE: 1 CUP COFFEE DAILY   Vaping Use   • Vaping Use: Never used   Substance and Sexual Activity   • Alcohol use: Yes     Alcohol/week: 1.0 - 2.0 standard drink     Types: 1 - 2 Cans of beer per week   • Drug use: No   • Sexual activity: Defer       Family History   Problem Relation Age of Onset   • No Known Problems Mother    • Throat cancer Father    • Arthritis Other    • Colon cancer Neg Hx    • Colon polyps Neg Hx    • Crohn's disease Neg Hx    • Irritable bowel syndrome Neg Hx    • Ulcerative colitis Neg Hx        Review of Systems   Constitutional: Negative.   HENT: Negative.    Eyes: Negative.    Cardiovascular: Negative.    Respiratory: Negative.    Endocrine: Negative.    Hematologic/Lymphatic:        Triplw therapy with asa 81mg/ plavix/ warfarin   Skin: Negative.    Gastrointestinal: Negative.    Neurological: Negative.    Psychiatric/Behavioral: Negative.   "  Allergic/Immunologic: Negative.        Allergies   Allergen Reactions   • Crestor [Rosuvastatin Calcium] Myalgia   • Pravastatin Sodium Myalgia   • Statins Myalgia   • Codeine Rash         Current Outpatient Medications:   •  allopurinol (ZYLOPRIM) 100 MG tablet, Take 100 mg by mouth 2 (Two) Times a Day., Disp: , Rfl:   •  aspirin 81 MG EC tablet, Take 81 mg by mouth Daily., Disp: , Rfl:   •  clopidogrel (PLAVIX) 75 MG tablet, Take 1 tablet by mouth Daily., Disp: 90 tablet, Rfl: 3  •  dutasteride (AVODART) 0.5 MG capsule, , Disp: , Rfl:   •  losartan (COZAAR) 50 MG tablet, Take 50 mg by mouth Daily., Disp: , Rfl: 5  •  metoprolol succinate XL (TOPROL-XL) 25 MG 24 hr tablet, Take 0.5 tablets by mouth Daily., Disp: , Rfl: 0  •  multivitamin (THERAGRAN) tablet tablet, Take 1 tablet by mouth Daily., Disp: , Rfl:   •  nitroglycerin (NITROSTAT) 0.4 MG SL tablet, 1 under the tongue as needed for angina, may repeat q5mins for up three doses, Disp: 100 tablet, Rfl: 11  •  pantoprazole (Protonix) 20 MG EC tablet, Take 1 tablet by mouth Daily., Disp: 30 tablet, Rfl: 5  •  Repatha SureClick solution auto-injector SureClick injection, INJECT 1 ML UNDER THE SKIN EVERY 14 DAYS, Disp: 2 mL, Rfl: 11  •  tiZANidine (ZANAFLEX) 4 MG tablet, Take 4 mg by mouth Every 6 (Six) Hours As Needed., Disp: , Rfl:   •  warfarin (COUMADIN) 4 MG tablet, Take 4 mg by mouth Daily., Disp: , Rfl:       Objective:     Vitals:    10/06/22 1036   BP: 132/82   BP Location: Left arm   Patient Position: Sitting   Pulse: 71   Weight: 96 kg (211 lb 9.6 oz)   Height: 175.3 cm (69\")     Body mass index is 31.25 kg/m².     Cardiac studies:  Left heart cath 9/29/2022:   Dominance: Right  Left Main: Large caliber vessel with 20% mid lesion  Left Anterior Descending: Medium caliber vessel with a 99% proximal lesion.  Diagonals: Medium caliber vessel in the proximal portion with a 70% ostial lesion.  Circumflex Artery: Medium caliber vessel which gives rise to a " large OM.  There is a 50% mid lesion.  There is also a 50% lesion in the OM1 proximal to the prior stent.  There is a patent stent in OM1.  Right Coronary Artery: Large caliber vessel with a 30% proximal lesion.  There is also a 30% mid lesion.  The PDA and RPL are medium caliber vessels that are free of disease.      Left Ventricle: 108/8 mmHg  Aorta: 125/59/85 mmHg    Lexiscan stress test 9/26/2022:  LVEF 63%, abnormal LV wall motion consistent with severe hypokinesis of anterior wall.  Large sized, severe area of ischemia located in anterior wall, apex and septal wall.      PHYSICAL EXAM:    Constitutional:       Appearance: Healthy appearance. Not in distress.   Neck:      Vascular: No JVR. JVD normal.   Pulmonary:      Effort: Pulmonary effort is normal.      Breath sounds: Normal breath sounds. No wheezing. No rhonchi. No rales.   Chest:      Chest wall: Not tender to palpatation.   Cardiovascular:      PMI at left midclavicular line. Normal rate. Regular rhythm. Normal S1. Normal S2.      Murmurs: There is no murmur.      No gallop. No click. No rub.   Pulses:     Intact distal pulses.   Edema:     Peripheral edema absent.   Abdominal:      General: Bowel sounds are normal.      Palpations: Abdomen is soft.      Tenderness: There is no abdominal tenderness.   Musculoskeletal: Normal range of motion.         General: No tenderness. Skin:     General: Skin is warm and dry.   Neurological:      General: No focal deficit present.      Mental Status: Alert and oriented to person, place and time.           ECG 12 Lead    Date/Time: 10/6/2022 11:11 AM  Performed by: Bernarda Figueroa APRN  Authorized by: Bernarda Figueroa APRN   Comparison: compared with previous ECG from 9/29/2022  Similar to previous ECG  Rhythm: sinus rhythm  Rate: normal  BPM: 71  Conduction: conduction normal  ST Segments: ST segments normal  T Waves: T waves normal    Clinical impression: normal ECG              Assessment:       Diagnosis  Plan   1. Coronary artery disease involving native coronary artery of native heart without angina pectoris     2. Essential hypertension     3. Mixed hyperlipidemia     4. History of pulmonary embolus (PE)     5. S/P primary angioplasty with coronary stent       No orders of the defined types were placed in this encounter.         Plan:       1.  Coronary artery disease: Status post previous stent to OM1 (2011), status post successful PCI with RAISSA to proximal LAD and balloon angioplasty to severe D1 lesion.  Normal LV systolic function.  On triple therapy with aspirin, Plavix and warfarin x 1 month.  After 1 month, patient will stop aspirin and continue Plavix and warfarin for 1 year uninterrupted.  [place] to enroll in cardiac rehab  2.  Hypertension: On beta-blocker and ARB  3.  Hyperlipidemia: Lipid panel 1 year ago shows triglycerides 197, total cholesterol 119, HDL 41 and LDL 38.  On Repatha.  Lipid panel to be drawn with in the next couple weeks.  Followed by PCP    Mr. Vu will follow up with Dr. Platt or Dr. Burrows in 6 to 8 weeks.  He will call sooner for any questions or concerns.           Your medication list          Accurate as of October 6, 2022 11:06 AM. If you have any questions, ask your nurse or doctor.            CONTINUE taking these medications      Instructions Last Dose Given Next Dose Due   allopurinol 100 MG tablet  Commonly known as: ZYLOPRIM      Take 100 mg by mouth 2 (Two) Times a Day.       aspirin 81 MG EC tablet      Take 81 mg by mouth Daily.       clopidogrel 75 MG tablet  Commonly known as: PLAVIX      Take 1 tablet by mouth Daily.       dutasteride 0.5 MG capsule  Commonly known as: AVODART           losartan 50 MG tablet  Commonly known as: COZAAR      Take 50 mg by mouth Daily.       metoprolol succinate XL 25 MG 24 hr tablet  Commonly known as: TOPROL-XL      Take 0.5 tablets by mouth Daily.       multivitamin tablet tablet      Take 1 tablet by mouth Daily.        nitroglycerin 0.4 MG SL tablet  Commonly known as: NITROSTAT      1 under the tongue as needed for angina, may repeat q5mins for up three doses       pantoprazole 20 MG EC tablet  Commonly known as: Protonix      Take 1 tablet by mouth Daily.       Repatha SureClick solution auto-injector SureClick injection  Generic drug: Evolocumab      INJECT 1 ML UNDER THE SKIN EVERY 14 DAYS       tiZANidine 4 MG tablet  Commonly known as: ZANAFLEX      Take 4 mg by mouth Every 6 (Six) Hours As Needed.       warfarin 4 MG tablet  Commonly known as: COUMADIN      Take 4 mg by mouth Daily.                As always, it has been a pleasure to participate in your patient's care.      Sincerely,       MONTEZ Silveira

## 2022-10-06 ENCOUNTER — OFFICE VISIT (OUTPATIENT)
Dept: CARDIOLOGY | Facility: CLINIC | Age: 77
End: 2022-10-06

## 2022-10-06 VITALS
WEIGHT: 211.6 LBS | HEART RATE: 71 BPM | SYSTOLIC BLOOD PRESSURE: 132 MMHG | BODY MASS INDEX: 31.34 KG/M2 | HEIGHT: 69 IN | DIASTOLIC BLOOD PRESSURE: 82 MMHG

## 2022-10-06 DIAGNOSIS — E78.2 MIXED HYPERLIPIDEMIA: ICD-10-CM

## 2022-10-06 DIAGNOSIS — I10 ESSENTIAL HYPERTENSION: ICD-10-CM

## 2022-10-06 DIAGNOSIS — I25.10 CORONARY ARTERY DISEASE INVOLVING NATIVE CORONARY ARTERY OF NATIVE HEART WITHOUT ANGINA PECTORIS: Primary | ICD-10-CM

## 2022-10-06 DIAGNOSIS — Z95.5 S/P PRIMARY ANGIOPLASTY WITH CORONARY STENT: ICD-10-CM

## 2022-10-06 DIAGNOSIS — Z86.711 HISTORY OF PULMONARY EMBOLUS (PE): ICD-10-CM

## 2022-10-06 PROCEDURE — 99214 OFFICE O/P EST MOD 30 MIN: CPT | Performed by: NURSE PRACTITIONER

## 2022-10-06 PROCEDURE — 93000 ELECTROCARDIOGRAM COMPLETE: CPT | Performed by: NURSE PRACTITIONER

## 2022-10-06 RX ORDER — TIZANIDINE 4 MG/1
4 TABLET ORAL EVERY 6 HOURS PRN
COMMUNITY
Start: 2022-10-03 | End: 2022-10-13

## 2022-10-12 ENCOUNTER — TRANSCRIBE ORDERS (OUTPATIENT)
Dept: CARDIAC REHAB | Facility: HOSPITAL | Age: 77
End: 2022-10-12

## 2022-10-12 DIAGNOSIS — Z95.5 STATUS POST INSERTION OF DRUG-ELUTING STENT INTO LEFT ANTERIOR DESCENDING (LAD) ARTERY: Primary | ICD-10-CM

## 2022-10-24 ENCOUNTER — OFFICE VISIT (OUTPATIENT)
Dept: CARDIAC REHAB | Facility: HOSPITAL | Age: 77
End: 2022-10-24

## 2022-10-24 DIAGNOSIS — Z95.5 S/P INSERTION OF NON-DRUG ELUTING CORONARY ARTERY STENT: Primary | ICD-10-CM

## 2022-10-24 PROCEDURE — 93798 PHYS/QHP OP CAR RHAB W/ECG: CPT

## 2022-10-26 ENCOUNTER — TREATMENT (OUTPATIENT)
Dept: CARDIAC REHAB | Facility: HOSPITAL | Age: 77
End: 2022-10-26

## 2022-10-26 DIAGNOSIS — Z95.5 S/P INSERTION OF NON-DRUG ELUTING CORONARY ARTERY STENT: Primary | ICD-10-CM

## 2022-10-26 PROCEDURE — 93798 PHYS/QHP OP CAR RHAB W/ECG: CPT

## 2022-10-28 ENCOUNTER — APPOINTMENT (OUTPATIENT)
Dept: CARDIAC REHAB | Facility: HOSPITAL | Age: 77
End: 2022-10-28

## 2022-10-31 ENCOUNTER — TREATMENT (OUTPATIENT)
Dept: CARDIAC REHAB | Facility: HOSPITAL | Age: 77
End: 2022-10-31

## 2022-10-31 DIAGNOSIS — Z95.5 S/P INSERTION OF NON-DRUG ELUTING CORONARY ARTERY STENT: Primary | ICD-10-CM

## 2022-10-31 PROCEDURE — 93798 PHYS/QHP OP CAR RHAB W/ECG: CPT

## 2022-11-02 ENCOUNTER — TREATMENT (OUTPATIENT)
Dept: CARDIAC REHAB | Facility: HOSPITAL | Age: 77
End: 2022-11-02

## 2022-11-02 DIAGNOSIS — Z95.5 S/P INSERTION OF NON-DRUG ELUTING CORONARY ARTERY STENT: Primary | ICD-10-CM

## 2022-11-02 PROCEDURE — 93798 PHYS/QHP OP CAR RHAB W/ECG: CPT

## 2022-11-04 ENCOUNTER — TELEPHONE (OUTPATIENT)
Dept: CARDIOLOGY | Facility: CLINIC | Age: 77
End: 2022-11-04

## 2022-11-04 ENCOUNTER — TREATMENT (OUTPATIENT)
Dept: CARDIAC REHAB | Facility: HOSPITAL | Age: 77
End: 2022-11-04

## 2022-11-04 DIAGNOSIS — I25.10 CORONARY ARTERY DISEASE INVOLVING NATIVE CORONARY ARTERY OF NATIVE HEART WITHOUT ANGINA PECTORIS: Primary | ICD-10-CM

## 2022-11-04 DIAGNOSIS — I10 ESSENTIAL HYPERTENSION: ICD-10-CM

## 2022-11-04 DIAGNOSIS — Z95.5 S/P INSERTION OF NON-DRUG ELUTING CORONARY ARTERY STENT: Primary | ICD-10-CM

## 2022-11-04 DIAGNOSIS — R73.9 HYPERGLYCEMIA, UNSPECIFIED: ICD-10-CM

## 2022-11-04 PROCEDURE — 93798 PHYS/QHP OP CAR RHAB W/ECG: CPT

## 2022-11-04 RX ORDER — LOSARTAN POTASSIUM 25 MG/1
25 TABLET ORAL DAILY
Qty: 90 TABLET | Refills: 0 | Status: SHIPPED | OUTPATIENT
Start: 2022-11-04 | End: 2023-01-31

## 2022-11-04 NOTE — TELEPHONE ENCOUNTER
Please follow-up with the patient.  I have ordered the A1c which he can complete they have his appointment with Dr. Ashley.    Regarding the low blood pressure, how is he feeling?  I would recommend reducing the dose of losartan to 25 mg.

## 2022-11-04 NOTE — TELEPHONE ENCOUNTER
"Miguel Espitia returned call. Patient reports he has been feeling good except for when going from laying down to standing up or when performing his exercises on the ground.  Patient reports \"flashes\" of dizziness when making position changes.  In cardiac rehab, patient reports blood pressure is usually between 100-105/60-65.      Patient is agreeable to reducing losartan dosage to 25 mg daily and is requesting a new prescription be sent in.  Patient stated he does not have a pill cutter and medication is not score.  Please send to [facility] on [address].    Please let me know if there is anything else you would like me to do for this patient.    Thank you,  Julissa Sanders RN  Triage Nurse [facility] [place]  "

## 2022-11-04 NOTE — TELEPHONE ENCOUNTER
The patient called and told him the nurse from the hospital called and told him he has to get an A1c check with his lipid panel that was ordered. Also he says his blood pressures have been low (100/60) ever since stent placement. He would like to know if he can change bp meds/dose. Please advise

## 2022-11-04 NOTE — TELEPHONE ENCOUNTER
Called and left VM. Will continue to try to reach patient.     Caryln Garduno RN  Triage [facility] [place]

## 2022-11-07 ENCOUNTER — TREATMENT (OUTPATIENT)
Dept: CARDIAC REHAB | Facility: HOSPITAL | Age: 77
End: 2022-11-07

## 2022-11-07 DIAGNOSIS — Z95.5 S/P INSERTION OF NON-DRUG ELUTING CORONARY ARTERY STENT: Primary | ICD-10-CM

## 2022-11-07 PROCEDURE — 93798 PHYS/QHP OP CAR RHAB W/ECG: CPT

## 2022-11-09 ENCOUNTER — LAB (OUTPATIENT)
Dept: LAB | Facility: HOSPITAL | Age: 77
End: 2022-11-09

## 2022-11-09 ENCOUNTER — TREATMENT (OUTPATIENT)
Dept: CARDIAC REHAB | Facility: HOSPITAL | Age: 77
End: 2022-11-09

## 2022-11-09 DIAGNOSIS — R73.9 HYPERGLYCEMIA, UNSPECIFIED: ICD-10-CM

## 2022-11-09 DIAGNOSIS — I10 ESSENTIAL HYPERTENSION: ICD-10-CM

## 2022-11-09 DIAGNOSIS — Z95.5 S/P INSERTION OF NON-DRUG ELUTING CORONARY ARTERY STENT: Primary | ICD-10-CM

## 2022-11-09 DIAGNOSIS — I25.10 CORONARY ARTERY DISEASE INVOLVING NATIVE CORONARY ARTERY OF NATIVE HEART WITHOUT ANGINA PECTORIS: ICD-10-CM

## 2022-11-09 LAB — HBA1C MFR BLD: 6.2 % (ref 4.8–5.6)

## 2022-11-09 PROCEDURE — 93798 PHYS/QHP OP CAR RHAB W/ECG: CPT

## 2022-11-09 PROCEDURE — 83036 HEMOGLOBIN GLYCOSYLATED A1C: CPT

## 2022-11-09 PROCEDURE — 36415 COLL VENOUS BLD VENIPUNCTURE: CPT

## 2022-11-10 NOTE — TELEPHONE ENCOUNTER
Please let him know I received the results of his hemoglobin A1c.  Results indicated an increased risk of diabetes which he should discuss with his PCP.  For some reason the lipid panel was not completed.

## 2022-11-11 ENCOUNTER — TREATMENT (OUTPATIENT)
Dept: CARDIAC REHAB | Facility: HOSPITAL | Age: 77
End: 2022-11-11

## 2022-11-11 DIAGNOSIS — Z95.5 S/P INSERTION OF NON-DRUG ELUTING CORONARY ARTERY STENT: Primary | ICD-10-CM

## 2022-11-11 PROCEDURE — 93798 PHYS/QHP OP CAR RHAB W/ECG: CPT

## 2022-11-14 ENCOUNTER — TREATMENT (OUTPATIENT)
Dept: CARDIAC REHAB | Facility: HOSPITAL | Age: 77
End: 2022-11-14

## 2022-11-14 DIAGNOSIS — Z95.5 S/P INSERTION OF NON-DRUG ELUTING CORONARY ARTERY STENT: Primary | ICD-10-CM

## 2022-11-14 PROCEDURE — 93798 PHYS/QHP OP CAR RHAB W/ECG: CPT

## 2022-11-16 ENCOUNTER — TREATMENT (OUTPATIENT)
Dept: CARDIAC REHAB | Facility: HOSPITAL | Age: 77
End: 2022-11-16

## 2022-11-16 DIAGNOSIS — Z95.5 S/P INSERTION OF NON-DRUG ELUTING CORONARY ARTERY STENT: Primary | ICD-10-CM

## 2022-11-16 PROCEDURE — 93798 PHYS/QHP OP CAR RHAB W/ECG: CPT

## 2022-11-17 ENCOUNTER — OFFICE VISIT (OUTPATIENT)
Dept: CARDIOLOGY | Facility: CLINIC | Age: 77
End: 2022-11-17

## 2022-11-17 VITALS
WEIGHT: 209 LBS | HEART RATE: 66 BPM | BODY MASS INDEX: 30.96 KG/M2 | SYSTOLIC BLOOD PRESSURE: 126 MMHG | HEIGHT: 69 IN | DIASTOLIC BLOOD PRESSURE: 72 MMHG

## 2022-11-17 DIAGNOSIS — E78.2 MIXED HYPERLIPIDEMIA: Primary | ICD-10-CM

## 2022-11-17 DIAGNOSIS — Z86.711 HISTORY OF PULMONARY EMBOLUS (PE): ICD-10-CM

## 2022-11-17 DIAGNOSIS — I25.10 CORONARY ARTERY DISEASE INVOLVING NATIVE CORONARY ARTERY OF NATIVE HEART WITHOUT ANGINA PECTORIS: ICD-10-CM

## 2022-11-17 DIAGNOSIS — I10 ESSENTIAL HYPERTENSION: ICD-10-CM

## 2022-11-17 DIAGNOSIS — Z95.5 S/P PRIMARY ANGIOPLASTY WITH CORONARY STENT: ICD-10-CM

## 2022-11-17 PROCEDURE — 93000 ELECTROCARDIOGRAM COMPLETE: CPT | Performed by: INTERNAL MEDICINE

## 2022-11-17 PROCEDURE — 99214 OFFICE O/P EST MOD 30 MIN: CPT | Performed by: INTERNAL MEDICINE

## 2022-11-17 RX ORDER — BACLOFEN 10 MG/1
1 TABLET ORAL AS NEEDED
COMMUNITY
Start: 2022-11-16

## 2022-11-17 NOTE — PROGRESS NOTES
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  He had a stress test that was previously done secondary to dyspnea on exertion along with chest pain.  He had a coronary angiography showing a 99% proximal LAD stenosis along with 70% diagonal stenosis.  His previously placed stent in OM1 was patent.  He underwent successful drug-eluting stent placement to the proximal LAD along with balloon angioplasty to the diagonal branch.  He was placed on triple therapy and subsequently discharged home.    Since that time he has been doing well.  He states that he has occasional indigestion but this does improve with belching.  This does not come on with activity.  He also reports some lightheadedness when he turns his head during exercise but denies that the room is spinning.  He reports cardiac rehab is going well.    Review of Systems   Constitutional: Negative for fever and malaise/fatigue.   HENT: Negative for nosebleeds and sore throat.    Eyes: Negative for blurred vision and double vision.   Cardiovascular: Negative for chest pain, claudication, palpitations and syncope.   Respiratory: Negative for cough, shortness of breath and snoring.    Endocrine: Negative for cold intolerance, heat intolerance and polydipsia.   Skin: Negative for itching, poor wound healing and rash.   Musculoskeletal: Negative for joint pain, joint swelling, muscle weakness and myalgias.   Gastrointestinal: Negative for abdominal pain, melena, nausea and vomiting.   Neurological: Negative for  light-headedness, loss of balance, seizures, vertigo and weakness.   Psychiatric/Behavioral: Negative for altered mental status and depression.       Past Medical History:   Diagnosis Date   • Abdominal pain, left lower quadrant    • Abnormal glucose    • Abnormal urine findings    • Acute upper respiratory infection    • Anticoagulated on warfarin    • Atopic dermatitis    • Mendiola's esophagus    • Belching    • Benign enlargement of prostate    • Blood clotting disorder (HCC)    • Body aches    • Calcification of coronary artery    • Cough    • DVT, lower extremity (HCC)    • Esophageal reflux    • Gout    • Greater trochanteric bursitis of right hip    • History of colon polyps    • Hypercholesteremia    • Hyperglycemia    • Hypertension    • Hypertrophy of prostate     Benign   • Inability to attain erection    • Incidental pulmonary nodule    • Infected nailbed of toe, unspecified laterality    • Influenza B    • Left hip pain    • LLQ abdominal tenderness    • Low back pain    • Lumbar strain    • Molluscum contagiosum    • Neck pain    • Notalgia paresthetica    • Obesity    • Osteoarthritis    • Personal history of PE (pulmonary embolism)    • Pes anserinus bursitis of right knee    • Pulmonary embolism (HCC) 12/2011   • Pulmonary nodule, right    • Renal cyst     Right   • Rheumatic fever    • Right hip pain    • Screen for colon cancer    • Shoulder pain, right    • Sore throat    • Stye    • Tinea versicolor    • TMJ arthralgia    • Trigger finger    • Venous thrombosis    • Vertigo    • Warfarin anticoagulation        The following portions of the patient's history were reviewed and updated as appropriate: Social history , Family history and Surgical history     Current Outpatient Medications on File Prior to Visit   Medication Sig Dispense Refill   • allopurinol (ZYLOPRIM) 100 MG tablet Take 100 mg by mouth 2 (Two) Times a Day.     • baclofen (LIORESAL) 10 MG tablet Take 1 tablet by mouth As Needed for  "Muscle Spasms.     • clopidogrel (PLAVIX) 75 MG tablet Take 1 tablet by mouth Daily. 90 tablet 3   • dutasteride (AVODART) 0.5 MG capsule      • losartan (COZAAR) 25 MG tablet Take 1 tablet by mouth Daily. 90 tablet 0   • metoprolol succinate XL (TOPROL-XL) 25 MG 24 hr tablet Take 0.5 tablets by mouth Daily.  0   • multivitamin (THERAGRAN) tablet tablet Take 1 tablet by mouth Daily.     • nitroglycerin (NITROSTAT) 0.4 MG SL tablet 1 under the tongue as needed for angina, may repeat q5mins for up three doses 100 tablet 11   • pantoprazole (Protonix) 20 MG EC tablet Take 1 tablet by mouth Daily. 30 tablet 5   • Repatha SureClick solution auto-injector SureClick injection INJECT 1 ML UNDER THE SKIN EVERY 14 DAYS 2 mL 11   • warfarin (COUMADIN) 4 MG tablet Take 4 mg by mouth Daily.     • [DISCONTINUED] aspirin 81 MG EC tablet Take 81 mg by mouth Daily.       No current facility-administered medications on file prior to visit.       Allergies   Allergen Reactions   • Crestor [Rosuvastatin Calcium] Myalgia   • Pravastatin Sodium Myalgia   • Statins Myalgia   • Codeine Rash       Vitals:    11/17/22 1512   BP: 126/72   Pulse: 66   Weight: 94.8 kg (209 lb)   Height: 175.3 cm (69\")     Body mass index is 30.86 kg/m².   Constitutional:       Appearance: Well-developed.   Eyes:      General: No scleral icterus.     Conjunctiva/sclera: Conjunctivae normal.   HENT:      Head: Normocephalic and atraumatic.   Neck:      Thyroid: No thyromegaly.      Vascular: Normal carotid pulses. No carotid bruit, hepatojugular reflux or JVD.      Trachea: No tracheal deviation.   Pulmonary:      Effort: No respiratory distress.      Breath sounds: Normal breath sounds. No decreased breath sounds. No wheezing. No rhonchi. No rales.   Chest:      Chest wall: Not tender to palpatation.   Cardiovascular:      Normal rate. Regular rhythm.      No gallop.   Pulses:     Carotid: 2+ bilaterally.     Radial: 2+ bilaterally.     Femoral: 2+ " bilaterally.     Dorsalis pedis: 2+ bilaterally.     Posterior tibial: 2+ bilaterally.  Edema:     Peripheral edema absent.   Abdominal:      General: Bowel sounds are normal. There is no distension.      Palpations: Abdomen is soft.      Tenderness: There is no abdominal tenderness.   Musculoskeletal:         General: No deformity.      Cervical back: Normal range of motion and neck supple. Skin:     Findings: No erythema or rash.   Neurological:      Mental Status: Alert and oriented to person, place, and time.      Sensory: No sensory deficit.   Psychiatric:         Behavior: Behavior normal.           Lab Results   Component Value Date    WBC 7.42 09/27/2022    HGB 15.9 09/27/2022    HCT 47.7 09/27/2022    MCV 99.2 (H) 09/27/2022     09/27/2022       Lab Results   Component Value Date    GLUCOSE 143 (H) 09/27/2022    BUN 21 09/27/2022    CREATININE 1.30 (H) 09/27/2022    BCR 16.2 09/27/2022    K 4.2 09/27/2022    CO2 25.7 09/27/2022    CALCIUM 9.2 09/27/2022    ALBUMIN 4.00 10/16/2020    AST 26 10/16/2020    ALT 21 10/16/2020       Lab Results   Component Value Date    GLUCOSE 143 (H) 09/27/2022    CALCIUM 9.2 09/27/2022     09/27/2022    K 4.2 09/27/2022    CO2 25.7 09/27/2022     (H) 09/27/2022    BUN 21 09/27/2022    CREATININE 1.30 (H) 09/27/2022    BCR 16.2 09/27/2022    ANIONGAP 7.3 09/27/2022       Lab Results   Component Value Date    CHOL 176 10/16/2020    TRIG 353 (H) 10/16/2020    HDL 40 10/16/2020    LDL 79 10/16/2020       9/29/2022  Findings:  1. Coronary Artery Anatomy:  Dominance: Right  Left Main: Large caliber vessel with 20% mid lesion  Left Anterior Descending: Medium caliber vessel with a 99% proximal lesion.  Diagonals: Medium caliber vessel in the proximal portion with a 70% ostial lesion.  Circumflex Artery: Medium caliber vessel which gives rise to a large OM.  There is a 50% mid lesion.  There is also a 50% lesion in the OM1 proximal to the prior stent.  There is a  patent stent in OM1.  Right Coronary Artery: Large caliber vessel with a 30% proximal lesion.  There is also a 30% mid lesion.  The PDA and RPL are medium caliber vessels that are free of disease.    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  He had a coronary angiography showing a 99% proximal LAD stenosis along with 70% diagonal stenosis.  His previously placed stent in OM1 was patent.  He underwent successful drug-eluting stent placement to the proximal LAD along with balloon angioplasty to the diagonal branch.     He has been doing very well since that time.  He denies any chest pain or dyspnea exertion.  He does have some lightheadedness when turning his head quickly but it does not sound like vertigo.      1.  Coronary artery disease with recent PCI to the mid LAD.  Prior PCI to OM1.  -Patient is doing well.  Denies any chest pain.  Recommend continuing Plavix monotherapy as he is also on warfarin.  -Continue Repatha therapy.  He has been intolerant to statin therapy in the past.  Direct LDL ordered today as he forgot to get a lipid panel.  - Continue Toprol at 12.5 milligrams daily.  Resting heart rate has been in the high 50s and as such he is not on a higher dose.    2.  Hyperlipidemia: Continue Repatha as above.  Direct LDL ordered.    3.  History of DVT and PE: Continue chronic warfarin therapy.  No bleeding complications.  Labs been reviewed and no significant  issues with anemia.    I will see him back in 6 months.

## 2022-11-18 ENCOUNTER — LAB (OUTPATIENT)
Dept: LAB | Facility: HOSPITAL | Age: 77
End: 2022-11-18

## 2022-11-18 ENCOUNTER — TREATMENT (OUTPATIENT)
Dept: CARDIAC REHAB | Facility: HOSPITAL | Age: 77
End: 2022-11-18

## 2022-11-18 DIAGNOSIS — E78.2 MIXED HYPERLIPIDEMIA: ICD-10-CM

## 2022-11-18 DIAGNOSIS — Z95.5 S/P INSERTION OF NON-DRUG ELUTING CORONARY ARTERY STENT: Primary | ICD-10-CM

## 2022-11-18 LAB — ARTICHOKE IGE QN: 32 MG/DL (ref 0–100)

## 2022-11-18 PROCEDURE — 83721 ASSAY OF BLOOD LIPOPROTEIN: CPT

## 2022-11-18 PROCEDURE — 93798 PHYS/QHP OP CAR RHAB W/ECG: CPT

## 2022-11-18 PROCEDURE — 36415 COLL VENOUS BLD VENIPUNCTURE: CPT

## 2022-11-21 ENCOUNTER — TREATMENT (OUTPATIENT)
Dept: CARDIAC REHAB | Facility: HOSPITAL | Age: 77
End: 2022-11-21

## 2022-11-21 DIAGNOSIS — Z95.5 S/P INSERTION OF NON-DRUG ELUTING CORONARY ARTERY STENT: Primary | ICD-10-CM

## 2022-11-21 PROCEDURE — 93798 PHYS/QHP OP CAR RHAB W/ECG: CPT

## 2022-11-23 ENCOUNTER — TREATMENT (OUTPATIENT)
Dept: CARDIAC REHAB | Facility: HOSPITAL | Age: 77
End: 2022-11-23

## 2022-11-23 DIAGNOSIS — Z95.5 S/P INSERTION OF NON-DRUG ELUTING CORONARY ARTERY STENT: Primary | ICD-10-CM

## 2022-11-23 PROCEDURE — 93798 PHYS/QHP OP CAR RHAB W/ECG: CPT

## 2022-11-28 ENCOUNTER — TREATMENT (OUTPATIENT)
Dept: CARDIAC REHAB | Facility: HOSPITAL | Age: 77
End: 2022-11-28

## 2022-11-28 DIAGNOSIS — Z95.5 S/P INSERTION OF NON-DRUG ELUTING CORONARY ARTERY STENT: Primary | ICD-10-CM

## 2022-11-28 PROCEDURE — 93798 PHYS/QHP OP CAR RHAB W/ECG: CPT

## 2022-11-30 ENCOUNTER — TREATMENT (OUTPATIENT)
Dept: CARDIAC REHAB | Facility: HOSPITAL | Age: 77
End: 2022-11-30

## 2022-11-30 DIAGNOSIS — Z95.5 S/P INSERTION OF NON-DRUG ELUTING CORONARY ARTERY STENT: Primary | ICD-10-CM

## 2022-11-30 PROCEDURE — 93798 PHYS/QHP OP CAR RHAB W/ECG: CPT

## 2022-12-02 ENCOUNTER — TREATMENT (OUTPATIENT)
Dept: CARDIAC REHAB | Facility: HOSPITAL | Age: 77
End: 2022-12-02
Payer: MEDICARE

## 2022-12-02 DIAGNOSIS — Z95.5 S/P INSERTION OF NON-DRUG ELUTING CORONARY ARTERY STENT: Primary | ICD-10-CM

## 2022-12-02 PROCEDURE — 93798 PHYS/QHP OP CAR RHAB W/ECG: CPT

## 2022-12-05 ENCOUNTER — APPOINTMENT (OUTPATIENT)
Dept: CARDIAC REHAB | Facility: HOSPITAL | Age: 77
End: 2022-12-05
Payer: MEDICARE

## 2022-12-07 ENCOUNTER — APPOINTMENT (OUTPATIENT)
Dept: CARDIAC REHAB | Facility: HOSPITAL | Age: 77
End: 2022-12-07
Payer: MEDICARE

## 2022-12-09 ENCOUNTER — APPOINTMENT (OUTPATIENT)
Dept: CARDIAC REHAB | Facility: HOSPITAL | Age: 77
End: 2022-12-09
Payer: MEDICARE

## 2022-12-12 ENCOUNTER — APPOINTMENT (OUTPATIENT)
Dept: CARDIAC REHAB | Facility: HOSPITAL | Age: 77
End: 2022-12-12
Payer: MEDICARE

## 2022-12-14 ENCOUNTER — APPOINTMENT (OUTPATIENT)
Dept: CARDIAC REHAB | Facility: HOSPITAL | Age: 77
End: 2022-12-14
Payer: MEDICARE

## 2022-12-16 ENCOUNTER — APPOINTMENT (OUTPATIENT)
Dept: CARDIAC REHAB | Facility: HOSPITAL | Age: 77
End: 2022-12-16
Payer: MEDICARE

## 2022-12-19 ENCOUNTER — APPOINTMENT (OUTPATIENT)
Dept: CARDIAC REHAB | Facility: HOSPITAL | Age: 77
End: 2022-12-19
Payer: MEDICARE

## 2022-12-21 ENCOUNTER — APPOINTMENT (OUTPATIENT)
Dept: CARDIAC REHAB | Facility: HOSPITAL | Age: 77
End: 2022-12-21
Payer: MEDICARE

## 2022-12-23 ENCOUNTER — APPOINTMENT (OUTPATIENT)
Dept: CARDIAC REHAB | Facility: HOSPITAL | Age: 77
End: 2022-12-23
Payer: MEDICARE

## 2022-12-28 ENCOUNTER — APPOINTMENT (OUTPATIENT)
Dept: CARDIAC REHAB | Facility: HOSPITAL | Age: 77
End: 2022-12-28
Payer: MEDICARE

## 2022-12-30 ENCOUNTER — APPOINTMENT (OUTPATIENT)
Dept: CARDIAC REHAB | Facility: HOSPITAL | Age: 77
End: 2022-12-30
Payer: MEDICARE

## 2023-01-31 RX ORDER — LOSARTAN POTASSIUM 25 MG/1
25 TABLET ORAL DAILY
Qty: 90 TABLET | Refills: 0 | Status: SHIPPED | OUTPATIENT
Start: 2023-01-31

## 2023-02-02 ENCOUNTER — TELEPHONE (OUTPATIENT)
Dept: CARDIOLOGY | Facility: CLINIC | Age: 78
End: 2023-02-02
Payer: MEDICARE

## 2023-02-02 NOTE — TELEPHONE ENCOUNTER
Patient needing to have an MRI done.  Needing to know if his stent is compatibly to have MRI done.      [phone]  Fax [phone]

## 2023-03-05 DIAGNOSIS — E78.2 MIXED HYPERLIPIDEMIA: ICD-10-CM

## 2023-03-06 RX ORDER — EVOLOCUMAB 140 MG/ML
INJECTION, SOLUTION SUBCUTANEOUS
Qty: 2 ML | Refills: 11 | Status: SHIPPED | OUTPATIENT
Start: 2023-03-06

## 2023-03-27 RX ORDER — PANTOPRAZOLE SODIUM 20 MG/1
20 TABLET, DELAYED RELEASE ORAL DAILY
Qty: 30 TABLET | Refills: 5 | Status: SHIPPED | OUTPATIENT
Start: 2023-03-27

## 2023-06-06 ENCOUNTER — OFFICE VISIT (OUTPATIENT)
Dept: CARDIOLOGY | Facility: CLINIC | Age: 78
End: 2023-06-06
Payer: MEDICARE

## 2023-06-06 VITALS
WEIGHT: 201.6 LBS | HEIGHT: 69 IN | DIASTOLIC BLOOD PRESSURE: 78 MMHG | BODY MASS INDEX: 29.86 KG/M2 | HEART RATE: 70 BPM | SYSTOLIC BLOOD PRESSURE: 118 MMHG

## 2023-06-06 DIAGNOSIS — Z86.711 HISTORY OF PULMONARY EMBOLUS (PE): ICD-10-CM

## 2023-06-06 DIAGNOSIS — I10 ESSENTIAL HYPERTENSION: ICD-10-CM

## 2023-06-06 DIAGNOSIS — I25.10 CORONARY ARTERY DISEASE INVOLVING NATIVE CORONARY ARTERY OF NATIVE HEART WITHOUT ANGINA PECTORIS: Primary | ICD-10-CM

## 2023-06-06 DIAGNOSIS — E78.2 MIXED HYPERLIPIDEMIA: ICD-10-CM

## 2023-06-06 DIAGNOSIS — Z95.5 S/P PRIMARY ANGIOPLASTY WITH CORONARY STENT: ICD-10-CM

## 2023-06-06 PROCEDURE — 93000 ELECTROCARDIOGRAM COMPLETE: CPT | Performed by: NURSE PRACTITIONER

## 2023-06-06 PROCEDURE — 3078F DIAST BP <80 MM HG: CPT | Performed by: NURSE PRACTITIONER

## 2023-06-06 PROCEDURE — 3074F SYST BP LT 130 MM HG: CPT | Performed by: NURSE PRACTITIONER

## 2023-06-06 PROCEDURE — 99214 OFFICE O/P EST MOD 30 MIN: CPT | Performed by: NURSE PRACTITIONER

## 2023-06-06 RX ORDER — SODIUM FLUORIDE 6 MG/ML
PASTE, DENTIFRICE DENTAL
COMMUNITY
Start: 2023-06-01

## 2023-06-06 RX ORDER — MULTIVIT WITH MINERALS/LUTEIN
250 TABLET ORAL DAILY
COMMUNITY

## 2023-06-06 NOTE — PROGRESS NOTES
Date of Office Visit: 2023  Encounter Provider: MONTEZ Stuart  Place of Service: [facility] CARDIOLOGY  Patient Name: Miguel Espitia  :1945    No chief complaint on file.  : follow up    HPI: Miguel Espitia is a 77 y.o. male who is a patient of Dr. Ashley.  He presents for a 6 month follow up.  He has a history of coronary artery disease status post prior PCI to OM1 in , hyperlipidemia, DVT/PE on warfarin.  Left heart cath on 2022 after an abnormal stress test showed 99% proximal lesion in LAD and 70% D1 lesion.  Stent to OM1 was patent.  He underwent successful PCI with RAISSA to proximal LAD and balloon angioplasty to severe D1 lesion.  He was discharged on triple therapy for 1 month then changed to Plavix and warfarin.    On his last office visit, patient complained of occasional indigestion which improved with belching.  His GI doctor changed him to Protonix after being placed on Plavix to help with GI issues.  He was going to cardiac rehab and doing well.  At the time, he had some vertigo like dizziness during exercise when he turns his head.    He is on Repatha and lipid panel is in target range.  Last week HbA1C is 6.0.  Patient presents today with no complaints.  His EKG is normal.  Blood pressure well controlled.  He is needing clearance for left knee surgery,a colonoscopy and a dental procedure.  From a cardiac standpoint, we would like patient to be on Plavix for a year after stent placement.  His warfarin is prescribed by PCP and INR is followed by their office.    Previous testing and notes have been reviewed by me.   Past Medical History:   Diagnosis Date    Abdominal pain, left lower quadrant     Abnormal glucose     Abnormal urine findings     Acute upper respiratory infection     Anticoagulated on warfarin     Atopic dermatitis     Mendiola's esophagus     Belching     Benign enlargement of prostate     Blood clotting disorder     Body  aches     Calcification of coronary artery     Cough     DVT, lower extremity     Esophageal reflux     Gout     Greater trochanteric bursitis of right hip     History of colon polyps     Hypercholesteremia     Hyperglycemia     Hypertension     Hypertrophy of prostate     Benign    Inability to attain erection     Incidental pulmonary nodule     Infected nailbed of toe, unspecified laterality     Influenza B     Left hip pain     LLQ abdominal tenderness     Low back pain     Lumbar strain     Molluscum contagiosum     Neck pain     Notalgia paresthetica     Obesity     Osteoarthritis     Personal history of PE (pulmonary embolism)     Pes anserinus bursitis of right knee     Pulmonary embolism 12/2011    Pulmonary nodule, right     Renal cyst     Right    Rheumatic fever     Right hip pain     Screen for colon cancer     Shoulder pain, right     Sore throat     Stye     Tinea versicolor     TMJ arthralgia     Trigger finger     Venous thrombosis     Vertigo     Warfarin anticoagulation        Past Surgical History:   Procedure Laterality Date    CARDIAC CATHETERIZATION  11/04/2011    x1 stent at [facility]    CARDIAC CATHETERIZATION N/A 9/29/2022    Procedure: Coronary angiography;  Surgeon: Mckinley Wall MD;  Location:  [facility];  Service: Cardiology;  Laterality: N/A;    CARDIAC CATHETERIZATION N/A 9/29/2022    Procedure: Left Heart Cath;  Surgeon: Mckinley Wall MD;  Location:  [facility];  Service: Cardiology;  Laterality: N/A;    CARDIAC CATHETERIZATION N/A 9/29/2022    Procedure: Stent RAISSA coronary;  Surgeon: Mckinley Wall MD;  Location:  [facility];  Service: Cardiology;  Laterality: N/A;    CARDIAC CATHETERIZATION N/A 9/29/2022    Procedure: Percutaneous Coronary Intervention;  Surgeon: Mckinley Wall MD;  Location:  [facility];  Service: Cardiology;  Laterality: N/A;    COLONOSCOPY      CORONARY ANGIOPLASTY WITH STENT PLACEMENT   11/04/2011    The patient underwent successful angioplasty and stent to the marginal and 95% stenosis reduced to 0%.    ENDOSCOPY      ENDOSCOPY N/A 9/14/2022    Procedure: ESOPHAGOGASTRODUODENOSCOPY;  Surgeon: Avtar Whitney MD;  Location: [facility];  Service: Gastroenterology;  Laterality: N/A;  IRREGULAR Z LINE FOR HIATAL HERNIA    HAND TENDON SURGERY      TENDON REPAIR RIGHT    HERNIA REPAIR         Social History     Socioeconomic History    Marital status:    Tobacco Use    Smoking status: Never    Smokeless tobacco: Never    Tobacco comments:     CAFFEINE USE: 1 CUP COFFEE DAILY   Vaping Use    Vaping Use: Never used   Substance and Sexual Activity    Alcohol use: Yes     Alcohol/week: 7.0 standard drinks     Types: 7 Cans of beer per week    Drug use: No    Sexual activity: Not Currently       Family History   Problem Relation Age of Onset    No Known Problems Mother     Throat cancer Father     Arthritis Other     Heart disease Maternal Uncle     Colon cancer Neg Hx     Colon polyps Neg Hx     Crohn's disease Neg Hx     Irritable bowel syndrome Neg Hx     Ulcerative colitis Neg Hx        Review of Systems   Constitutional: Negative.   HENT: Negative.     Eyes: Negative.    Cardiovascular: Negative.    Respiratory: Negative.     Endocrine: Negative.    Hematologic/Lymphatic:        Plavix/ warfarin   Skin: Negative.    Musculoskeletal:         Left knee pain   Gastrointestinal: Negative.    Genitourinary: Negative.    Neurological: Negative.    Psychiatric/Behavioral: Negative.     Allergic/Immunologic: Negative.      Allergies   Allergen Reactions    Crestor [Rosuvastatin Calcium] Myalgia    Pravastatin Sodium Myalgia    Statins Myalgia    Codeine Rash         Current Outpatient Medications:     allopurinol (ZYLOPRIM) 100 MG tablet, Take 100 mg by mouth 2 (Two) Times a Day., Disp: , Rfl:     baclofen (LIORESAL) 10 MG tablet, Take 1 tablet by mouth As Needed for Muscle Spasms., Disp: , Rfl:      clopidogrel (PLAVIX) 75 MG tablet, Take 1 tablet by mouth Daily., Disp: 90 tablet, Rfl: 3    dutasteride (AVODART) 0.5 MG capsule, , Disp: , Rfl:     losartan (COZAAR) 25 MG tablet, TAKE 1 TABLET BY MOUTH DAILY, Disp: 90 tablet, Rfl: 0    metoprolol succinate XL (TOPROL-XL) 25 MG 24 hr tablet, Take 0.5 tablets by mouth Daily., Disp: , Rfl: 0    multivitamin (THERAGRAN) tablet tablet, Take 1 tablet by mouth Daily., Disp: , Rfl:     nitroglycerin (NITROSTAT) 0.4 MG SL tablet, 1 under the tongue as needed for angina, may repeat q5mins for up three doses, Disp: 100 tablet, Rfl: 11    pantoprazole (PROTONIX) 20 MG EC tablet, TAKE 1 TABLET BY MOUTH DAILY, Disp: 30 tablet, Rfl: 5    Repatha SureClick solution auto-injector SureClick injection, ADMINISTER 1 ML UNDER THE SKIN EVERY 14 DAYS, Disp: 2 mL, Rfl: 11    warfarin (COUMADIN) 4 MG tablet, Take 4 mg by mouth Daily., Disp: , Rfl:       Objective:     There were no vitals filed for this visit.    There is no height or weight on file to calculate BMI.     Cardiac studies:  Left heart cath 9/29/2022:   Dominance: Right  Left Main: Large caliber vessel with 20% mid lesion  Left Anterior Descending: Medium caliber vessel with a 99% proximal lesion.  Diagonals: Medium caliber vessel in the proximal portion with a 70% ostial lesion.  Circumflex Artery: Medium caliber vessel which gives rise to a large OM.  There is a 50% mid lesion.  There is also a 50% lesion in the OM1 proximal to the prior stent.  There is a patent stent in OM1.  Right Coronary Artery: Large caliber vessel with a 30% proximal lesion.  There is also a 30% mid lesion.  The PDA and RPL are medium caliber vessels that are free of disease.      Left Ventricle: 108/8 mmHg  Aorta: 125/59/85 mmHg    Lexiscan stress test 9/26/2022:  LVEF 63%, abnormal LV wall motion consistent with severe hypokinesis of anterior wall.  Large sized, severe area of ischemia located in anterior wall, apex and septal wall.      PHYSICAL  EXAM:    Constitutional:       Appearance: Healthy appearance. Not in distress.   Neck:      Vascular: No JVR. JVD normal.   Pulmonary:      Effort: Pulmonary effort is normal.      Breath sounds: Normal breath sounds. No wheezing. No rhonchi. No rales.   Chest:      Chest wall: Not tender to palpatation.   Cardiovascular:      PMI at left midclavicular line. Normal rate. Regular rhythm. Normal S1. Normal S2.       Murmurs: There is no murmur.      No gallop.  No click. No rub.   Pulses:     Intact distal pulses.   Edema:     Peripheral edema absent.   Abdominal:      General: Bowel sounds are normal.      Palpations: Abdomen is soft.      Tenderness: There is no abdominal tenderness.   Musculoskeletal: Normal range of motion.         General: No tenderness. Skin:     General: Skin is warm and dry.   Neurological:      General: No focal deficit present.      Mental Status: Alert and oriented to person, place and time.         ECG 12 Lead    Date/Time: 6/6/2023 11:58 AM  Performed by: Bernarda Figueroa APRN  Authorized by: Bernarda Figueroa APRN   Comparison: compared with previous ECG from 11/17/2022  Similar to previous ECG  Rhythm: sinus rhythm  BPM: 70  Conduction: conduction normal    Clinical impression: normal ECG          Assessment:       Diagnosis Plan   1. Coronary artery disease involving native coronary artery of native heart without angina pectoris        2. S/P primary angioplasty with coronary stent        3. History of pulmonary embolus (PE)        4. Essential hypertension        5. Mixed hyperlipidemia          No orders of the defined types were placed in this encounter.         Plan:       1.  Coronary artery disease: Status post previous stent to OM1 (2011), status post successful PCI with RAISSA to proximal LAD and balloon angioplasty to severe D1 lesion.  Normal LV systolic function.  On Plavix and warfarin.  No angina.  Normal EKG  2.  Hypertension: Well-controlled on beta-blocker and  ARB  3.  Hyperlipidemia: Most recent LDL 32.  On Repatha.  Followed by PCP  4.  History of PE/DVT: on warfarin.  INR followed by PCP office    Mr. Espitia is needing surgical clearance for knee surgery, dental procedure and colonoscopy.  From a cardiac standpoint, we would like patient to be on his Plavix for 1 year after last stent placement.  He is scheduled for knee surgery on 9/19/2023, last coronary stent was placed on 9/29/2022.  This would be okay from our standpoint to stop Plavix 5 days prior to his surgery.  We would then recommend that he be placed on aspirin 81 mg as soon as okay with surgeon.  Patient's warfarin is prescribed by PCP and INR followed by their office.  I have recommended patient to go through his prescriber's office for EKG.  Patient will follow up with Dr. Ashley in 6 months.  He will call sooner for any questions or concerns.                Your medication list            Accurate as of June 6, 2023  8:00 AM. If you have any questions, ask your nurse or doctor.                CONTINUE taking these medications        Instructions Last Dose Given Next Dose Due   allopurinol 100 MG tablet  Commonly known as: ZYLOPRIM      Take 100 mg by mouth 2 (Two) Times a Day.       baclofen 10 MG tablet  Commonly known as: LIORESAL      Take 1 tablet by mouth As Needed for Muscle Spasms.       clopidogrel 75 MG tablet  Commonly known as: PLAVIX      Take 1 tablet by mouth Daily.       dutasteride 0.5 MG capsule  Commonly known as: AVODART           losartan 25 MG tablet  Commonly known as: COZAAR      TAKE 1 TABLET BY MOUTH DAILY       metoprolol succinate XL 25 MG 24 hr tablet  Commonly known as: TOPROL-XL      Take 0.5 tablets by mouth Daily.       multivitamin tablet tablet      Take 1 tablet by mouth Daily.       nitroglycerin 0.4 MG SL tablet  Commonly known as: NITROSTAT      1 under the tongue as needed for angina, may repeat q5mins for up three doses       pantoprazole 20 MG EC  tablet  Commonly known as: PROTONIX      TAKE 1 TABLET BY MOUTH DAILY       Repatha SureClick solution auto-injector SureClick injection  Generic drug: Evolocumab      ADMINISTER 1 ML UNDER THE SKIN EVERY 14 DAYS       warfarin 4 MG tablet  Commonly known as: COUMADIN      Take 4 mg by mouth Daily.                  As always, it has been a pleasure to participate in your patient's care.      Sincerely,       MONTEZ Silveira

## 2023-06-19 ENCOUNTER — TELEPHONE (OUTPATIENT)
Dept: CARDIOLOGY | Facility: CLINIC | Age: 78
End: 2023-06-19
Payer: MEDICARE

## 2023-06-19 NOTE — TELEPHONE ENCOUNTER
Dr. Knutson (oral surgery) is requesting a callback today if possible on this patient.    [phone]    Thank you,    Sondra William, RN  Triage [facility]  06/19/23 09:56 EDT

## 2023-08-24 ENCOUNTER — TELEPHONE (OUTPATIENT)
Dept: CARDIOLOGY | Facility: CLINIC | Age: 78
End: 2023-08-24
Payer: MEDICARE

## 2023-08-24 NOTE — TELEPHONE ENCOUNTER
Patient calling needing Knee Surgery with Dr. Adams Sept 19 fax [phone]    Patient needs to know if he can hold plavix for 7 days and warfarin 5 days prior  Also want to know if he needs bridged with lovenox  Been a year since his stent. Wants to know if after surgery should he go back on plavix or stay off and just take a baby aspirin    [phone]

## 2023-08-25 RX ORDER — ENOXAPARIN SODIUM 100 MG/ML
1 INJECTION SUBCUTANEOUS EVERY 12 HOURS SCHEDULED
Qty: 9.6 ML | Refills: 0 | Status: SHIPPED | OUTPATIENT
Start: 2023-08-25 | End: 2023-08-31

## 2023-08-25 NOTE — TELEPHONE ENCOUNTER
Plavix can be held.  I would recommend stopping his warfarin 5 days prior.  He would take Lovenox starting 3 days prior.  He will hold the night before his surgery in the morning.  He should be able to restart the day after his surgery and complete the prescription.  This will be called in.

## 2023-09-15 RX ORDER — PANTOPRAZOLE SODIUM 20 MG/1
20 TABLET, DELAYED RELEASE ORAL DAILY
Qty: 30 TABLET | Refills: 5 | Status: SHIPPED | OUTPATIENT
Start: 2023-09-15

## 2023-09-25 RX ORDER — CLOPIDOGREL BISULFATE 75 MG/1
75 TABLET ORAL DAILY
Qty: 90 TABLET | Refills: 3 | Status: SHIPPED | OUTPATIENT
Start: 2023-09-25

## 2023-09-26 ENCOUNTER — TELEPHONE (OUTPATIENT)
Dept: CARDIOLOGY | Facility: CLINIC | Age: 78
End: 2023-09-26
Payer: MEDICARE

## 2023-09-26 NOTE — TELEPHONE ENCOUNTER
Pt called. He said that after hiss surgery Dr. Adams decided to switch him off of the warfarin and on to Eliquis.     Apparently Dr. Adams spoke with you and it was agreed that he stop both the warfarin and the Plavix, and remain on only Eliquis 2.5mg bid.     He has CAD and a history of a PE.    He is wanting to know if we can fill his Eliquis as he only has enough for 2 more days.    Please advise if you agree with these med changes and if you are okay with sending in his Eliquis.    Thanks,  Sylvie

## 2023-09-27 NOTE — TELEPHONE ENCOUNTER
Sounds good  If he has had no bleeding issues he is to restart his Warfarin and Plavix. That was the discussion his surgeon and I had

## 2023-09-27 NOTE — TELEPHONE ENCOUNTER
At the end of the 10 days? And start the day after the Eliquis is done? And when to recheck INR?    Please advise.    Thanks,  Sylvie

## 2023-09-29 NOTE — TELEPHONE ENCOUNTER
Called pt and advised. He wants to know how long after his stents he will be taking the Plavix. His cath was done on 9/29/22.    Please advise.    Thanks,  Sylvie

## 2023-09-29 NOTE — TELEPHONE ENCOUNTER
"  Caller: Miguel Espitia \"KATTY\"    Relationship: Self    Best call back number: [phone]    What is the best time to reach you:ANY    Who are you requesting to speak with (clinical staff, provider,  specific staff member): CLINICAL    Do you know the name of the person who called: JOE LEE    What was the call regarding: MEDICATION    Is it okay if the provider responds through [employer]: YES- BUT PLEASE CALL        "

## 2023-10-02 NOTE — TELEPHONE ENCOUNTER
With multiple vessel stented I would recommend continuing Plavix lifelong.  I think it would be a better option for him than aspirin.  He does not need to take both

## 2023-12-06 ENCOUNTER — OFFICE VISIT (OUTPATIENT)
Dept: CARDIOLOGY | Facility: CLINIC | Age: 78
End: 2023-12-06
Payer: MEDICARE

## 2023-12-06 VITALS
DIASTOLIC BLOOD PRESSURE: 68 MMHG | BODY MASS INDEX: 29.62 KG/M2 | HEART RATE: 65 BPM | WEIGHT: 200 LBS | HEIGHT: 69 IN | SYSTOLIC BLOOD PRESSURE: 108 MMHG

## 2023-12-06 DIAGNOSIS — E78.2 MIXED HYPERLIPIDEMIA: Primary | ICD-10-CM

## 2023-12-06 DIAGNOSIS — I10 ESSENTIAL HYPERTENSION: ICD-10-CM

## 2023-12-06 DIAGNOSIS — Z95.5 S/P PRIMARY ANGIOPLASTY WITH CORONARY STENT: ICD-10-CM

## 2023-12-06 DIAGNOSIS — Z86.711 HISTORY OF PULMONARY EMBOLUS (PE): ICD-10-CM

## 2023-12-06 DIAGNOSIS — I25.10 CORONARY ARTERY DISEASE INVOLVING NATIVE CORONARY ARTERY OF NATIVE HEART WITHOUT ANGINA PECTORIS: ICD-10-CM

## 2023-12-06 PROCEDURE — 93000 ELECTROCARDIOGRAM COMPLETE: CPT | Performed by: NURSE PRACTITIONER

## 2023-12-06 PROCEDURE — 3074F SYST BP LT 130 MM HG: CPT | Performed by: NURSE PRACTITIONER

## 2023-12-06 PROCEDURE — 3078F DIAST BP <80 MM HG: CPT | Performed by: NURSE PRACTITIONER

## 2023-12-06 PROCEDURE — 99214 OFFICE O/P EST MOD 30 MIN: CPT | Performed by: NURSE PRACTITIONER

## 2023-12-06 RX ORDER — LOSARTAN POTASSIUM 50 MG/1
50 TABLET ORAL DAILY
Qty: 90 TABLET | Refills: 3 | Status: SHIPPED | OUTPATIENT
Start: 2023-12-06

## 2023-12-06 RX ORDER — METOPROLOL SUCCINATE 25 MG/1
12.5 TABLET, EXTENDED RELEASE ORAL DAILY
Qty: 45 TABLET | Refills: 3 | Status: SHIPPED | OUTPATIENT
Start: 2023-12-06

## 2023-12-06 NOTE — PROGRESS NOTES
Date of Office Visit: 2023  Encounter Provider: MONTEZ Stuart  Place of Service: [facility] CARDIOLOGY  Patient Name: Miguel Espitia  :1945    No chief complaint on file.  : follow up    HPI: Miguel Espitia is a 78 y.o. male who is a patient of Dr. Ashley.  He presents for a 6 month follow up.  He has a history of coronary artery disease status post prior PCI to OM1 in , hyperlipidemia, DVT/PE on warfarin.  Left heart cath on 2022, after an abnormal stress test, showed 99% proximal lesion in LAD and 70% D1 lesion.  Stent to OM1 was patent.  He underwent successful PCI with RAISSA to proximal LAD and balloon angioplasty to severe D1 lesion.  He was discharged on triple therapy for 1 month then changed to Plavix and warfarin.    Underwent left knee arthroplasty in September.  He is back to his normal activities.  He has no complaints of chest pain, lightheadedness, shortness of air or lower extremity edema.  His blood pressure is well-controlled.  EKG is stable.   He is on Repatha and his cholesterol has been doing well.  He has a follow up with PCP in the next couple of weeks.     Previous testing and notes have been reviewed by me.   Past Medical History:   Diagnosis Date    Abdominal pain, left lower quadrant     Abnormal glucose     Abnormal urine findings     Acute upper respiratory infection     Anticoagulated on warfarin     Atopic dermatitis     Mendiola's esophagus     Belching     Benign enlargement of prostate     Blood clotting disorder     Body aches     Calcification of coronary artery     Cough     DVT, lower extremity     Esophageal reflux     Gout     Greater trochanteric bursitis of right hip     History of colon polyps     Hypercholesteremia     Hyperglycemia     Hypertension     Hypertrophy of prostate     Benign    Inability to attain erection     Incidental pulmonary nodule     Infected nailbed of toe, unspecified laterality      Influenza B     Left hip pain     LLQ abdominal tenderness     Low back pain     Lumbar strain     Molluscum contagiosum     Neck pain     Notalgia paresthetica     Obesity     Osteoarthritis     Personal history of PE (pulmonary embolism)     Pes anserinus bursitis of right knee     Pulmonary embolism 12/2011    Pulmonary nodule, right     Renal cyst     Right    Rheumatic fever     Right hip pain     Screen for colon cancer     Shoulder pain, right     Sore throat     Stye     Tinea versicolor     TMJ arthralgia     Trigger finger     Venous thrombosis     Vertigo     Warfarin anticoagulation        Past Surgical History:   Procedure Laterality Date    CARDIAC CATHETERIZATION  11/04/2011    x1 stent at [facility]    CARDIAC CATHETERIZATION N/A 9/29/2022    Procedure: Coronary angiography;  Surgeon: Mckinley Wall MD;  Location:  [facility];  Service: Cardiology;  Laterality: N/A;    CARDIAC CATHETERIZATION N/A 9/29/2022    Procedure: Left Heart Cath;  Surgeon: Mckinley Wlal MD;  Location:  [facility];  Service: Cardiology;  Laterality: N/A;    CARDIAC CATHETERIZATION N/A 9/29/2022    Procedure: Stent RAISSA coronary;  Surgeon: Mckinley Wall MD;  Location:  [facility];  Service: Cardiology;  Laterality: N/A;    CARDIAC CATHETERIZATION N/A 9/29/2022    Procedure: Percutaneous Coronary Intervention;  Surgeon: Mckinley Wall MD;  Location:  [facility];  Service: Cardiology;  Laterality: N/A;    COLONOSCOPY      CORONARY ANGIOPLASTY WITH STENT PLACEMENT  11/04/2011    The patient underwent successful angioplasty and stent to the marginal and 95% stenosis reduced to 0%.    ENDOSCOPY      ENDOSCOPY N/A 9/14/2022    Procedure: ESOPHAGOGASTRODUODENOSCOPY;  Surgeon: Avtar Whitney MD;  Location: [facility];  Service: Gastroenterology;  Laterality: N/A;  IRREGULAR Z LINE FOR HIATAL HERNIA    HAND TENDON SURGERY      TENDON REPAIR RIGHT    HERNIA REPAIR          Social History     Socioeconomic History    Marital status:    Tobacco Use    Smoking status: Never    Smokeless tobacco: Never    Tobacco comments:     CAFFEINE USE: 1 CUP COFFEE DAILY   Vaping Use    Vaping Use: Never used   Substance and Sexual Activity    Alcohol use: Yes     Alcohol/week: 7.0 standard drinks of alcohol     Types: 7 Cans of beer per week    Drug use: No    Sexual activity: Not Currently       Family History   Problem Relation Age of Onset    No Known Problems Mother     Throat cancer Father     Arthritis Other     Heart disease Maternal Uncle     Colon cancer Neg Hx     Colon polyps Neg Hx     Crohn's disease Neg Hx     Irritable bowel syndrome Neg Hx     Ulcerative colitis Neg Hx        Review of Systems   Constitutional: Negative.   HENT: Negative.     Eyes: Negative.    Cardiovascular: Negative.    Respiratory: Negative.     Endocrine: Negative.    Hematologic/Lymphatic:        Plavix/ warfarin   Skin: Negative.    Musculoskeletal: Negative.    Gastrointestinal: Negative.    Genitourinary: Negative.    Neurological: Negative.    Psychiatric/Behavioral: Negative.     Allergic/Immunologic: Negative.        Allergies   Allergen Reactions    Crestor [Rosuvastatin Calcium] Myalgia    Pravastatin Sodium Myalgia    Statins Myalgia    Codeine Rash         Current Outpatient Medications:     allopurinol (ZYLOPRIM) 100 MG tablet, Take 1 tablet by mouth 2 (Two) Times a Day., Disp: , Rfl:     baclofen (LIORESAL) 10 MG tablet, Take 1 tablet by mouth As Needed for Muscle Spasms., Disp: , Rfl:     clopidogrel (PLAVIX) 75 MG tablet, Take 1 tablet by mouth Daily., Disp: 90 tablet, Rfl: 3    dutasteride (AVODART) 0.5 MG capsule, , Disp: , Rfl:     losartan (COZAAR) 25 MG tablet, TAKE 1 TABLET BY MOUTH DAILY (Patient taking differently: Take 1 tablet by mouth Daily. TAKE 1 TABLET ( 25 MG ) DAILY ALTERNATING EVERY OTHER DAY WITH 2 TABLETS ( 50 MG) .), Disp: 90 tablet, Rfl: 0    metoprolol succinate  XL (TOPROL-XL) 25 MG 24 hr tablet, Take 0.5 tablets by mouth Daily., Disp: , Rfl: 0    multivitamin (THERAGRAN) tablet tablet, Take 1 tablet by mouth Daily., Disp: , Rfl:     nitroglycerin (NITROSTAT) 0.4 MG SL tablet, 1 under the tongue as needed for angina, may repeat q5mins for up three doses, Disp: 100 tablet, Rfl: 11    pantoprazole (PROTONIX) 20 MG EC tablet, TAKE 1 TABLET BY MOUTH DAILY, Disp: 30 tablet, Rfl: 5    PreviDent 5000 Booster Plus 1.1 % paste, , Disp: , Rfl:     Repatha SureClick solution auto-injector SureClick injection, ADMINISTER 1 ML UNDER THE SKIN EVERY 14 DAYS, Disp: 2 mL, Rfl: 11    vitamin C (ASCORBIC ACID) 250 MG tablet, Take 1 tablet by mouth Daily., Disp: , Rfl:     warfarin (COUMADIN) 4 MG tablet, Take 1 tablet by mouth Daily., Disp: , Rfl:       Objective:     There were no vitals filed for this visit.    There is no height or weight on file to calculate BMI.     Cardiac studies:  Left heart cath 9/29/2022:   Dominance: Right  Left Main: Large caliber vessel with 20% mid lesion  Left Anterior Descending: Medium caliber vessel with a 99% proximal lesion.  Diagonals: Medium caliber vessel in the proximal portion with a 70% ostial lesion.  Circumflex Artery: Medium caliber vessel which gives rise to a large OM.  There is a 50% mid lesion.  There is also a 50% lesion in the OM1 proximal to the prior stent.  There is a patent stent in OM1.  Right Coronary Artery: Large caliber vessel with a 30% proximal lesion.  There is also a 30% mid lesion.  The PDA and RPL are medium caliber vessels that are free of disease.      Left Ventricle: 108/8 mmHg  Aorta: 125/59/85 mmHg    Lexiscan stress test 9/26/2022:  LVEF 63%, abnormal LV wall motion consistent with severe hypokinesis of anterior wall.  Large sized, severe area of ischemia located in anterior wall, apex and septal wall.    2D Echocardiogram 2/3/2017:  Normal left ventricular systolic function.  Mild concentric left ventricular  hypertrophy  Ejection fraction visually estimated at 65%  Grade 1 diastolic dysfunction  Normal right ventricular size and function  No obvious Wisdom sign seen  No significant valvular disease    PHYSICAL EXAM:    Constitutional:       Appearance: Healthy appearance. Not in distress.   Neck:      Vascular: No JVR. JVD normal.   Pulmonary:      Effort: Pulmonary effort is normal.      Breath sounds: Normal breath sounds. No wheezing. No rhonchi. No rales.   Chest:      Chest wall: Not tender to palpatation.   Cardiovascular:      PMI at left midclavicular line. Normal rate. Regular rhythm. Normal S1. Normal S2.       Murmurs: There is no murmur.      No gallop.  No click. No rub.   Pulses:     Intact distal pulses.   Edema:     Peripheral edema absent.   Abdominal:      General: Bowel sounds are normal.      Palpations: Abdomen is soft.      Tenderness: There is no abdominal tenderness.   Musculoskeletal: Normal range of motion.         General: No tenderness. Skin:     General: Skin is warm and dry.   Neurological:      General: No focal deficit present.      Mental Status: Alert and oriented to person, place and time.           ECG 12 Lead    Date/Time: 12/6/2023 12:51 PM  Performed by: Bernarda Figueroa APRN    Authorized by: Bernarda Figueroa APRN  Comparison: compared with previous ECG from 6/6/2023  Similar to previous ECG  Rhythm: sinus rhythm  BPM: 65  Conduction: conduction normal  QRS axis: normal            Assessment:       Diagnosis Plan   1. Mixed hyperlipidemia        2. Essential hypertension        3. Coronary artery disease involving native coronary artery of native heart without angina pectoris        4. S/P primary angioplasty with coronary stent        5. History of pulmonary embolus (PE)            No orders of the defined types were placed in this encounter.         Plan:       1.  Coronary artery disease: Status post previous stent to OM1 (2011), status post successful PCI with RAISSA to  proximal LAD and balloon angioplasty to severe D1 lesion (09/2022).  Normal LV systolic function.  On Plavix and warfarin.  No angina.  Normal EKG  2.  Hypertension: Well-controlled on beta-blocker and ARB  3.  Hyperlipidemia: Most recent LDL 32.  On Repatha.  Followed by PCP  4.  History of PE/DVT: on warfarin.  INR followed by PCP office    Mr. Espitia will follow-up with Dr. Ashley in 6 months.  He will call sooner for any questions or concerns.                 Your medication list            Accurate as of December 6, 2023  9:28 AM. If you have any questions, ask your nurse or doctor.                CHANGE how you take these medications        Instructions Last Dose Given Next Dose Due   losartan 25 MG tablet  Commonly known as: COZAAR  What changed: additional instructions      TAKE 1 TABLET BY MOUTH DAILY              CONTINUE taking these medications        Instructions Last Dose Given Next Dose Due   allopurinol 100 MG tablet  Commonly known as: ZYLOPRIM      Take 1 tablet by mouth 2 (Two) Times a Day.       baclofen 10 MG tablet  Commonly known as: LIORESAL      Take 1 tablet by mouth As Needed for Muscle Spasms.       clopidogrel 75 MG tablet  Commonly known as: PLAVIX      Take 1 tablet by mouth Daily.       dutasteride 0.5 MG capsule  Commonly known as: AVODART           metoprolol succinate XL 25 MG 24 hr tablet  Commonly known as: TOPROL-XL      Take 0.5 tablets by mouth Daily.       multivitamin tablet tablet      Take 1 tablet by mouth Daily.       nitroglycerin 0.4 MG SL tablet  Commonly known as: NITROSTAT      1 under the tongue as needed for angina, may repeat q5mins for up three doses       pantoprazole 20 MG EC tablet  Commonly known as: PROTONIX      TAKE 1 TABLET BY MOUTH DAILY       PreviDent 5000 Booster Plus 1.1 % paste  Generic drug: Sodium Fluoride           Repatha SureClick solution auto-injector SureClick injection  Generic drug: Evolocumab      ADMINISTER 1 ML UNDER THE SKIN EVERY  14 DAYS       vitamin C 250 MG tablet  Commonly known as: ASCORBIC ACID      Take 1 tablet by mouth Daily.       warfarin 4 MG tablet  Commonly known as: COUMADIN      Take 1 tablet by mouth Daily.                  As always, it has been a pleasure to participate in your patient's care.      Sincerely,       MONTEZ Silveira

## 2024-02-28 DIAGNOSIS — E78.2 MIXED HYPERLIPIDEMIA: ICD-10-CM

## 2024-02-28 RX ORDER — EVOLOCUMAB 140 MG/ML
INJECTION, SOLUTION SUBCUTANEOUS
Qty: 2 ML | Refills: 11 | Status: SHIPPED | OUTPATIENT
Start: 2024-02-28

## 2024-03-25 RX ORDER — PANTOPRAZOLE SODIUM 20 MG/1
20 TABLET, DELAYED RELEASE ORAL DAILY
Qty: 30 TABLET | Refills: 5 | OUTPATIENT
Start: 2024-03-25

## 2024-03-25 NOTE — TELEPHONE ENCOUNTER
LOV   09/14/2022 EGD w/Dr Chelo WARE   n    Patient Message with Mychart, Generic Provider (03/25/2024)

## 2024-03-25 NOTE — TELEPHONE ENCOUNTER
Patient called again requesting a refill of Protonix.  Per note in chart, patient hasn't been seen since an EGD in 2022.  He also states he's been waiting on a call to schedule a CLS.  Told him this message would be sent to scheduling and that after an appointment was scheduled, we would forward that info to his provider and request a Protonix refill until CLS is performed.

## 2024-03-28 RX ORDER — PANTOPRAZOLE SODIUM 20 MG/1
20 TABLET, DELAYED RELEASE ORAL DAILY
Qty: 30 TABLET | Refills: 5 | OUTPATIENT
Start: 2024-03-28

## 2024-04-01 ENCOUNTER — OFFICE VISIT (OUTPATIENT)
Dept: GASTROENTEROLOGY | Facility: CLINIC | Age: 79
End: 2024-04-01
Payer: MEDICARE

## 2024-04-01 ENCOUNTER — PATIENT ROUNDING (BHMG ONLY) (OUTPATIENT)
Dept: GASTROENTEROLOGY | Facility: CLINIC | Age: 79
End: 2024-04-01
Payer: MEDICARE

## 2024-04-01 VITALS
TEMPERATURE: 96.5 F | SYSTOLIC BLOOD PRESSURE: 120 MMHG | DIASTOLIC BLOOD PRESSURE: 80 MMHG | HEART RATE: 84 BPM | HEIGHT: 69 IN | WEIGHT: 199.8 LBS | OXYGEN SATURATION: 96 % | BODY MASS INDEX: 29.59 KG/M2

## 2024-04-01 DIAGNOSIS — K22.70 BARRETT'S ESOPHAGUS WITHOUT DYSPLASIA: Primary | ICD-10-CM

## 2024-04-01 DIAGNOSIS — K44.9 HIATAL HERNIA: ICD-10-CM

## 2024-04-01 PROCEDURE — 99213 OFFICE O/P EST LOW 20 MIN: CPT | Performed by: INTERNAL MEDICINE

## 2024-04-01 PROCEDURE — 3079F DIAST BP 80-89 MM HG: CPT | Performed by: INTERNAL MEDICINE

## 2024-04-01 PROCEDURE — 1159F MED LIST DOCD IN RCRD: CPT | Performed by: INTERNAL MEDICINE

## 2024-04-01 PROCEDURE — 3074F SYST BP LT 130 MM HG: CPT | Performed by: INTERNAL MEDICINE

## 2024-04-01 PROCEDURE — 1160F RVW MEDS BY RX/DR IN RCRD: CPT | Performed by: INTERNAL MEDICINE

## 2024-04-01 RX ORDER — PANTOPRAZOLE SODIUM 40 MG/1
40 TABLET, DELAYED RELEASE ORAL DAILY
Qty: 90 TABLET | Refills: 3 | Status: SHIPPED | OUTPATIENT
Start: 2024-04-01 | End: 2024-04-03

## 2024-04-01 RX ORDER — KETOCONAZOLE 20 MG/G
CREAM TOPICAL
COMMUNITY
Start: 2024-03-07

## 2024-04-01 NOTE — PROGRESS NOTES
"No chief complaint on file.          History of Present Illness  Patient here for follow-up on Mendiola's esophagus.  His last EGD was in September 2022 showing very minimal Mendiola's esophagus and no dysplasia.  He is on schedule for repeat EGD in September 2025 for surveillance.  His last colonoscopy was in June 2018.  He is unsure if he had a polyp on this exam and I cannot find any pathology reports.       Result Review :       UPPER GI ENDOSCOPY (09/14/2022 09:25)   Tissue Pathology Exam (09/14/2022 09:42)   Progress Notes by Bernarda Figueroa APRN (12/06/2023 11:00)   CT Abdomen & Pelvis Wo IV Contrast Without Oral Contrast (04/16/2017 10:16)     Vital Signs:   /80   Pulse 84   Temp 96.5 °F (35.8 °C)   Ht 175.3 cm (69.02\")   Wt 90.6 kg (199 lb 12.8 oz)   SpO2 96%   BMI 29.49 kg/m²     Body mass index is 29.49 kg/m².     Physical Exam  Constitutional:       Appearance: Normal appearance.   Abdominal:      General: There is no distension.      Palpations: Abdomen is soft. There is no mass.      Tenderness: There is no abdominal tenderness.   Neurological:      Mental Status: He is alert.   Psychiatric:         Mood and Affect: Mood normal.           Assessment and Plan    Diagnoses and all orders for this visit:    1. Mendiola's esophagus without dysplasia (Primary)  Comments:  Due for surveillance EGD in September 2025    2. Hiatal hernia         BRIEF SUMMARY  Patient for follow-up.  He is doing well from the standpoint of his reflux.  Will go ahead and send him in a refill for his pantoprazole 40 mg daily.  He is on schedule for repeat EGD in September 2025 for surveillance of Mendiola's esophagus.  We will try to find the report from pathology on his colonoscopy from 2018 to see if he is due for colonoscopy or not.  The patient states he will ask his primary care physician for that report and send it to us.    I have reviewed and confirmed the accuracy of the HPI and Assessment and Plan as " documented by the APRN Avtar Whitney MD        Follow Up   No follow-ups on file.    There are no Patient Instructions on file for this visit.

## 2024-04-03 ENCOUNTER — TELEPHONE (OUTPATIENT)
Dept: GASTROENTEROLOGY | Facility: CLINIC | Age: 79
End: 2024-04-03
Payer: MEDICARE

## 2024-04-03 RX ORDER — PANTOPRAZOLE SODIUM 20 MG/1
20 TABLET, DELAYED RELEASE ORAL DAILY
Qty: 90 TABLET | Refills: 3 | Status: SHIPPED | OUTPATIENT
Start: 2024-04-03

## 2024-04-03 NOTE — TELEPHONE ENCOUNTER
Patient called to have RX for pantoprazole 40mg changed back to 20mg as previously prescribed.  RX changed per CC.

## 2024-04-07 NOTE — PROGRESS NOTES
Done at checkout    Tell me about your visit with us. What things went well?  Very nice       We're always looking for ways to make our patients' experiences even better. Do you have recommendations on ways we may improve?  no    Overall were you satisfied with your first visit to our practice? yes       I appreciate you taking the time to speak with me today. Is there anything else I can do for you? no      Thank you, and have a great day.

## 2024-04-15 ENCOUNTER — TELEPHONE (OUTPATIENT)
Dept: GASTROENTEROLOGY | Facility: CLINIC | Age: 79
End: 2024-04-15
Payer: MEDICARE

## 2024-04-15 NOTE — TELEPHONE ENCOUNTER
"  Caller: Miguel Espitia \"KATTY\"    Relationship: Self    Best call back number: [phone]    What is the best time to reach you: ANYTIME    Who are you requesting to speak with (clinical staff, provider,  specific staff member): CLINICAL STAFF      What was the call regarding: PT IS CALLING TO GET FU FROM LAST VISIT CONCERNING TAKING A  COLOGUARD TEST OR NOT. PLEASE CONTACT PT TO ADVISE AND ASSIST.     Is it okay if the provider responds through MyChart: NO    "

## 2024-04-16 NOTE — TELEPHONE ENCOUNTER
RN has requested previous colonoscopy records from 2018 to determine if patient is eligable for Cologuard. Will update patient with information once records have been received and provider has made recommendations. EL

## 2024-04-17 ENCOUNTER — PREP FOR SURGERY (OUTPATIENT)
Dept: SURGERY | Facility: SURGERY CENTER | Age: 79
End: 2024-04-17
Payer: MEDICARE

## 2024-04-17 DIAGNOSIS — Z86.010 PERSONAL HISTORY OF COLONIC POLYPS: ICD-10-CM

## 2024-04-17 DIAGNOSIS — Z12.11 ENCOUNTER FOR SCREENING FOR MALIGNANT NEOPLASM OF COLON: Primary | ICD-10-CM

## 2024-04-17 RX ORDER — SODIUM CHLORIDE, SODIUM LACTATE, POTASSIUM CHLORIDE, CALCIUM CHLORIDE 600; 310; 30; 20 MG/100ML; MG/100ML; MG/100ML; MG/100ML
30 INJECTION, SOLUTION INTRAVENOUS CONTINUOUS PRN
OUTPATIENT
Start: 2024-04-17

## 2024-04-17 RX ORDER — SODIUM CHLORIDE 0.9 % (FLUSH) 0.9 %
3 SYRINGE (ML) INJECTION EVERY 12 HOURS SCHEDULED
OUTPATIENT
Start: 2024-04-17

## 2024-04-17 RX ORDER — SODIUM CHLORIDE 0.9 % (FLUSH) 0.9 %
10 SYRINGE (ML) INJECTION AS NEEDED
OUTPATIENT
Start: 2024-04-17

## 2024-04-17 NOTE — TELEPHONE ENCOUNTER
RN spoke with patient regarding provider's recommendations. Pt is agreeable. RN will place order for colonoscopy and forward to scheduling department. EL

## 2024-04-17 NOTE — TELEPHONE ENCOUNTER
RN called and left voicemail for patient regarding whether patient needs colonoscopy or Cologuard. Per Dr. Whitney patient is due for colonoscopy screening. History of polyps, so not eligible for Cologuard test. EL

## 2024-05-21 PROBLEM — Z86.0100 PERSONAL HISTORY OF COLONIC POLYPS: Status: ACTIVE | Noted: 2024-04-17

## 2024-05-21 PROBLEM — Z12.11 ENCOUNTER FOR SCREENING FOR MALIGNANT NEOPLASM OF COLON: Status: ACTIVE | Noted: 2024-04-17

## 2024-05-21 PROBLEM — Z86.010 PERSONAL HISTORY OF COLONIC POLYPS: Status: ACTIVE | Noted: 2024-04-17

## 2024-06-21 ENCOUNTER — TELEPHONE (OUTPATIENT)
Dept: GASTROENTEROLOGY | Facility: CLINIC | Age: 79
End: 2024-06-21
Payer: MEDICARE

## 2024-06-21 NOTE — TELEPHONE ENCOUNTER
Pt has CLS 7/16/24 and was told that our office was going to contact his Cardiologist to discuss his current use of Warfarin and Plavix . Pt requested an update.

## 2024-07-03 ENCOUNTER — TELEPHONE (OUTPATIENT)
Age: 79
End: 2024-07-03
Payer: MEDICARE

## 2024-07-03 NOTE — TELEPHONE ENCOUNTER
We received a fax on the pt wanting to know if he is clear to have a colonoscopy on 7/16/24. They also want to know if he can hold his Plavix and warfarin for 5 days prior to his procedure.    We saw him last on 12/6/23. Last PCI done on 9/29/22. He is on Plavix for prior PCI and warfarin for history of PE.    Please advise.    Thanks,  Sylvie

## 2024-07-16 ENCOUNTER — ANESTHESIA (OUTPATIENT)
Dept: SURGERY | Facility: SURGERY CENTER | Age: 79
End: 2024-07-16
Payer: MEDICARE

## 2024-07-16 ENCOUNTER — ANESTHESIA EVENT (OUTPATIENT)
Dept: SURGERY | Facility: SURGERY CENTER | Age: 79
End: 2024-07-16
Payer: MEDICARE

## 2024-07-16 ENCOUNTER — HOSPITAL ENCOUNTER (OUTPATIENT)
Facility: SURGERY CENTER | Age: 79
Setting detail: HOSPITAL OUTPATIENT SURGERY
Discharge: HOME | End: 2024-07-16
Attending: INTERNAL MEDICINE | Admitting: INTERNAL MEDICINE
Payer: MEDICARE

## 2024-07-16 VITALS
DIASTOLIC BLOOD PRESSURE: 74 MMHG | WEIGHT: 199 LBS | BODY MASS INDEX: 29.47 KG/M2 | HEIGHT: 69 IN | TEMPERATURE: 97.8 F | HEART RATE: 61 BPM | SYSTOLIC BLOOD PRESSURE: 133 MMHG | RESPIRATION RATE: 16 BRPM | OXYGEN SATURATION: 95 %

## 2024-07-16 DIAGNOSIS — Z86.010 PERSONAL HISTORY OF COLONIC POLYPS: ICD-10-CM

## 2024-07-16 DIAGNOSIS — Z12.11 ENCOUNTER FOR SCREENING FOR MALIGNANT NEOPLASM OF COLON: ICD-10-CM

## 2024-07-16 PROCEDURE — 45385 COLONOSCOPY W/LESION REMOVAL: CPT | Performed by: INTERNAL MEDICINE

## 2024-07-16 PROCEDURE — 88305 TISSUE EXAM BY PATHOLOGIST: CPT | Performed by: INTERNAL MEDICINE

## 2024-07-16 PROCEDURE — 25010000002 PROPOFOL 1000 MG/100ML EMULSION: Performed by: NURSE ANESTHETIST, CERTIFIED REGISTERED

## 2024-07-16 PROCEDURE — 25810000003 LACTATED RINGERS PER 1000 ML: Performed by: INTERNAL MEDICINE

## 2024-07-16 PROCEDURE — 25010000002 PROPOFOL 10 MG/ML EMULSION: Performed by: NURSE ANESTHETIST, CERTIFIED REGISTERED

## 2024-07-16 PROCEDURE — 25010000002 LIDOCAINE 1 % SOLUTION: Performed by: NURSE ANESTHETIST, CERTIFIED REGISTERED

## 2024-07-16 RX ORDER — SODIUM CHLORIDE 0.9 % (FLUSH) 0.9 %
10 SYRINGE (ML) INJECTION AS NEEDED
Status: DISCONTINUED | OUTPATIENT
Start: 2024-07-16 | End: 2024-07-16 | Stop reason: HOSPADM

## 2024-07-16 RX ORDER — PROPOFOL 10 MG/ML
INJECTION, EMULSION INTRAVENOUS AS NEEDED
Status: DISCONTINUED | OUTPATIENT
Start: 2024-07-16 | End: 2024-07-16 | Stop reason: SURG

## 2024-07-16 RX ORDER — LIDOCAINE HYDROCHLORIDE 10 MG/ML
INJECTION, SOLUTION INFILTRATION; PERINEURAL AS NEEDED
Status: DISCONTINUED | OUTPATIENT
Start: 2024-07-16 | End: 2024-07-16 | Stop reason: SURG

## 2024-07-16 RX ORDER — SODIUM CHLORIDE, SODIUM LACTATE, POTASSIUM CHLORIDE, CALCIUM CHLORIDE 600; 310; 30; 20 MG/100ML; MG/100ML; MG/100ML; MG/100ML
30 INJECTION, SOLUTION INTRAVENOUS CONTINUOUS PRN
Status: DISCONTINUED | OUTPATIENT
Start: 2024-07-16 | End: 2024-07-16 | Stop reason: HOSPADM

## 2024-07-16 RX ORDER — SODIUM CHLORIDE 0.9 % (FLUSH) 0.9 %
3 SYRINGE (ML) INJECTION EVERY 12 HOURS SCHEDULED
Status: DISCONTINUED | OUTPATIENT
Start: 2024-07-16 | End: 2024-07-16 | Stop reason: HOSPADM

## 2024-07-16 RX ADMIN — SODIUM CHLORIDE, POTASSIUM CHLORIDE, SODIUM LACTATE AND CALCIUM CHLORIDE 30 ML/HR: 600; 310; 30; 20 INJECTION, SOLUTION INTRAVENOUS at 07:23

## 2024-07-16 RX ADMIN — PROPOFOL 80 MG: 10 INJECTION, EMULSION INTRAVENOUS at 07:56

## 2024-07-16 RX ADMIN — LIDOCAINE HYDROCHLORIDE 100 MG: 10 INJECTION, SOLUTION INFILTRATION; PERINEURAL at 07:56

## 2024-07-16 RX ADMIN — PROPOFOL 160 MCG/KG/MIN: 10 INJECTION, EMULSION INTRAVENOUS at 07:56

## 2024-07-16 NOTE — H&P
No chief complaint on file.      HPI  Patient today for a screening colonoscopy.  He had a colonoscopy in 2018 with sigmoid diverticulosis and an adenomatous polyp.         Problem List:    Patient Active Problem List   Diagnosis    Mixed hyperlipidemia    Essential hypertension    Coronary artery disease involving native coronary artery of native heart without angina pectoris    History of pulmonary embolus (PE)    Mendiola's esophagus without dysplasia    S/P primary angioplasty with coronary stent    Encounter for screening for malignant neoplasm of colon    Personal history of colonic polyps       Medical History:    Past Medical History:   Diagnosis Date    Abdominal pain, left lower quadrant     Abnormal glucose     Abnormal urine findings     Acute upper respiratory infection     Anticoagulated on warfarin     Atopic dermatitis     Mendiola's esophagus     Belching     Benign enlargement of prostate     Blood clotting disorder     Body aches     Calcification of coronary artery     Cough     DVT, lower extremity     Esophageal reflux     Gout     Greater trochanteric bursitis of right hip     History of colon polyps     Hypercholesteremia     Hyperglycemia     Hypertension     Hypertrophy of prostate     Benign    Inability to attain erection     Incidental pulmonary nodule     Infected nailbed of toe, unspecified laterality     Influenza B     Left hip pain     LLQ abdominal tenderness     Low back pain     Lumbar strain     Molluscum contagiosum     Neck pain     Notalgia paresthetica     Obesity     Osteoarthritis     Personal history of PE (pulmonary embolism)     Pes anserinus bursitis of right knee     Pulmonary embolism 12/2011    Pulmonary nodule, right     Renal cyst     Right    Rheumatic fever     Right hip pain     Screen for colon cancer     Shoulder pain, right     Sore throat     Stye     Tinea versicolor     TMJ arthralgia     Trigger finger     Venous thrombosis     Vertigo     Warfarin  anticoagulation         Social History:    Social History     Socioeconomic History    Marital status:    Tobacco Use    Smoking status: Never    Smokeless tobacco: Never    Tobacco comments:     CAFFEINE USE: 1 CUP COFFEE DAILY   Vaping Use    Vaping status: Never Used   Substance and Sexual Activity    Alcohol use: Yes     Alcohol/week: 7.0 standard drinks of alcohol     Types: 7 Cans of beer per week    Drug use: No    Sexual activity: Not Currently       Family History:   Family History   Problem Relation Age of Onset    No Known Problems Mother     Throat cancer Father     Arthritis Other     Heart disease Maternal Uncle     Colon cancer Neg Hx     Colon polyps Neg Hx     Crohn's disease Neg Hx     Irritable bowel syndrome Neg Hx     Ulcerative colitis Neg Hx        Surgical History:   Past Surgical History:   Procedure Laterality Date    CARDIAC CATHETERIZATION  11/04/2011    x1 stent at [facility]    CARDIAC CATHETERIZATION N/A 9/29/2022    Procedure: Coronary angiography;  Surgeon: Mckinley Wall MD;  Location:  [facility];  Service: Cardiology;  Laterality: N/A;    CARDIAC CATHETERIZATION N/A 9/29/2022    Procedure: Left Heart Cath;  Surgeon: Mckinley Wall MD;  Location:  [facility];  Service: Cardiology;  Laterality: N/A;    CARDIAC CATHETERIZATION N/A 9/29/2022    Procedure: Stent RAISSA coronary;  Surgeon: Mckinley Wall MD;  Location:  [facility];  Service: Cardiology;  Laterality: N/A;    CARDIAC CATHETERIZATION N/A 9/29/2022    Procedure: Percutaneous Coronary Intervention;  Surgeon: Mckinley Wall MD;  Location:  [facility];  Service: Cardiology;  Laterality: N/A;    COLONOSCOPY      CORONARY ANGIOPLASTY WITH STENT PLACEMENT  11/04/2011    The patient underwent successful angioplasty and stent to the marginal and 95% stenosis reduced to 0%.    ENDOSCOPY      ENDOSCOPY N/A 9/14/2022    Procedure: ESOPHAGOGASTRODUODENOSCOPY;   Surgeon: Avtar Whitney MD;  Location: [facility];  Service: Gastroenterology;  Laterality: N/A;  IRREGULAR Z LINE FOR HIATAL HERNIA    HAND TENDON SURGERY      TENDON REPAIR RIGHT    HERNIA REPAIR           Current Facility-Administered Medications:     lactated ringers infusion, 30 mL/hr, Intravenous, Continuous PRN, Avtar Whitney MD    sodium chloride 0.9 % flush 10 mL, 10 mL, Intravenous, PRN, Avtar Whitney MD    sodium chloride 0.9 % flush 3 mL, 3 mL, Intravenous, Q12H, Avtar Whitney MD    Allergies:   Allergies   Allergen Reactions    Crestor [Rosuvastatin Calcium] Myalgia    Pravastatin Sodium Myalgia    Statins Myalgia    Sulfa Antibiotics Unknown - High Severity    Codeine Rash        The following portions of the patient's history were reviewed by me and updated as appropriate: review of systems, allergies, current medications, past family history, past medical history, past social history, past surgical history and problem list.    There were no vitals filed for this visit.    PHYSICAL EXAM:    CONSTITUTIONAL:  today's vital signs reviewed by me  GASTROINTESTINAL: abdomen is soft nontender nondistended with normal active bowel sounds, no masses are appreciated    Assessment/ Plan  Will proceed today with colonoscopy.    Risks and benefits as well as alternatives to endoscopic evaluation were explained to the patient and they voiced understanding and wish to proceed.  These risks include but are not limited to the risk of bleeding, perforation, adverse reaction to sedation, and missed lesions.  The patient was given the opportunity to ask questions prior to the endoscopic procedure.

## 2024-07-16 NOTE — ANESTHESIA POSTPROCEDURE EVALUATION
Patient: Miguel Espitia    Procedure Summary       Date: 07/16/24 Room / Location: [facility]    Anesthesia Start: 0751 Anesthesia Stop: 0819    Procedure: COLONOSCOPY FOR SCREENING to Cecum with Polypectomy Diagnosis:       Encounter for screening for malignant neoplasm of colon      Personal history of colonic polyps      (Encounter for screening for malignant neoplasm of colon [Z12.11])      (Personal history of colonic polyps [Z86.010])    Surgeons: Avtar Whitney MD Provider: Kavita Reese MD    Anesthesia Type: MAC ASA Status: 3            Anesthesia Type: MAC    Vitals  Vitals Value Taken Time   /74 07/16/24 0835   Temp 36.6 °C (97.8 °F) 07/16/24 0815   Pulse 61 07/16/24 0835   Resp 16 07/16/24 0835   SpO2 95 % 07/16/24 0835           Post Anesthesia Care and Evaluation    Patient location during evaluation: PHASE II  Patient participation: complete - patient participated  Level of consciousness: awake  Pain management: adequate    Airway patency: patent  Anesthetic complications: No anesthetic complications  PONV Status: none  Cardiovascular status: stable  Respiratory status: acceptable  Hydration status: acceptable

## 2024-07-16 NOTE — ANESTHESIA PREPROCEDURE EVALUATION
Anesthesia Evaluation     Patient summary reviewed and Nursing notes reviewed   no history of anesthetic complications:   NPO Solid Status: > 8 hours  NPO Liquid Status: > 2 hours           Airway   Mallampati: II  TM distance: <3 FB  Neck ROM: full  Dental    (+) partials    Comment: Multiple missing teeth/partials    Pulmonary - normal exam   (+) pulmonary embolism (2012),  (-) COPD, asthma  Cardiovascular   Exercise tolerance: good (4-7 METS)    ECG reviewed  PT is on anticoagulation therapy  Patient on routine beta blocker  Rhythm: regular    (+) hypertension, CAD, cardiac stents (2011, 2022) more than 12 months ago , DVT, hyperlipidemia  (-) past MI, angina      Neuro/Psych  (-) seizures, CVA  GI/Hepatic/Renal/Endo    (+) obesity, GERD  (-) liver disease, no renal disease    Musculoskeletal     Abdominal    Substance History - negative use     OB/GYN          Other   arthritis,                 Anesthesia Plan    ASA 3     MAC     (Positive KYLAH screen/DX:  2 or more mitigating factors include non-supine recovery position, none or reduced opiate use      I have reviewed the patient's history and chart with the patient, including all pertinent laboratory results and imaging. I have explained the risks of monitored anesthesia care including but not limited to respiratory depression, possible need for airway intervention, or possible intra-op recall. I explained that airway intervention involves risk of possible dental injury.  )  intravenous induction     Anesthetic plan, risks, benefits, and alternatives have been provided, discussed and informed consent has been obtained with: patient.  Pre-procedure education provided    CODE STATUS:

## 2024-07-17 LAB
LAB AP CASE REPORT: NORMAL
PATH REPORT.FINAL DX SPEC: NORMAL
PATH REPORT.GROSS SPEC: NORMAL

## 2024-08-22 ENCOUNTER — OFFICE VISIT (OUTPATIENT)
Age: 79
End: 2024-08-22
Payer: MEDICARE

## 2024-08-22 VITALS
DIASTOLIC BLOOD PRESSURE: 80 MMHG | WEIGHT: 201 LBS | BODY MASS INDEX: 29.77 KG/M2 | HEART RATE: 60 BPM | HEIGHT: 69 IN | SYSTOLIC BLOOD PRESSURE: 118 MMHG

## 2024-08-22 DIAGNOSIS — I25.10 CORONARY ARTERY DISEASE INVOLVING NATIVE CORONARY ARTERY OF NATIVE HEART WITHOUT ANGINA PECTORIS: ICD-10-CM

## 2024-08-22 DIAGNOSIS — I10 ESSENTIAL HYPERTENSION: ICD-10-CM

## 2024-08-22 DIAGNOSIS — Z95.5 S/P PRIMARY ANGIOPLASTY WITH CORONARY STENT: ICD-10-CM

## 2024-08-22 DIAGNOSIS — E78.2 MIXED HYPERLIPIDEMIA: Primary | ICD-10-CM

## 2024-08-22 PROCEDURE — 3079F DIAST BP 80-89 MM HG: CPT | Performed by: INTERNAL MEDICINE

## 2024-08-22 PROCEDURE — 3074F SYST BP LT 130 MM HG: CPT | Performed by: INTERNAL MEDICINE

## 2024-08-22 PROCEDURE — 93000 ELECTROCARDIOGRAM COMPLETE: CPT | Performed by: INTERNAL MEDICINE

## 2024-08-22 PROCEDURE — 99214 OFFICE O/P EST MOD 30 MIN: CPT | Performed by: INTERNAL MEDICINE

## 2024-08-22 RX ORDER — LOSARTAN POTASSIUM 25 MG/1
25 TABLET ORAL DAILY
Qty: 90 TABLET | Refills: 3 | Status: SHIPPED | OUTPATIENT
Start: 2024-08-22

## 2024-08-22 NOTE — PROGRESS NOTES
Date of Office Visit: 24    Encounter Provider: Tra Ashley MD  Place of Service: [facility] CARDIOLOGY  Patient Name: Miguel Espitia  :1945    Chief complaint  Coronary artery disease  Hyperlipidemia    HPI:  79 y.o. male with a medical history of coronary artery disease status post prior PCI to OM1 in , hyperlipidemia, DVT/PE on warfarin.  Left heart cath on 2022, after an abnormal stress test, showed 99% proximal lesion in LAD and 70% D1 lesion.  Stent to OM1 was patent.  He underwent successful PCI with RAISSA to proximal LAD and balloon angioplasty to severe D1 lesion.  He was discharged on triple therapy for 1 month then changed to Plavix and warfarin. EKG is stable.   He is on Repatha and his cholesterol has been doing well.  He still has mild dyspnea when he goes up a couple flights of stairs but states that this resolves very quickly.  He has no chest pain consistent with angina.    Previous testing and notes have been reviewed by me.     Past Medical History:   Diagnosis Date    Abdominal pain, left lower quadrant     Abnormal glucose     Abnormal urine findings     Acute upper respiratory infection     Anticoagulated on warfarin     Atopic dermatitis     Mendiola's esophagus     Belching     Benign enlargement of prostate     Blood clotting disorder     Body aches     Calcification of coronary artery     Cough     DVT, lower extremity     Esophageal reflux     Gout     Greater trochanteric bursitis of right hip     History of colon polyps     Hypercholesteremia     Hyperglycemia     Hypertension     Hypertrophy of prostate     Benign    Inability to attain erection     Incidental pulmonary nodule     Infected nailbed of toe, unspecified laterality     Influenza B     Left hip pain     LLQ abdominal tenderness     Low back pain     Lumbar strain     Molluscum contagiosum     Neck pain     Notalgia paresthetica     Obesity     Osteoarthritis      Personal history of PE (pulmonary embolism)     Pes anserinus bursitis of right knee     Pulmonary embolism 12/2011    Pulmonary nodule, right     Renal cyst     Right    Rheumatic fever     Right hip pain     Screen for colon cancer     Shoulder pain, right     Sore throat     Stye     Tinea versicolor     TMJ arthralgia     Trigger finger     Venous thrombosis     Vertigo     Warfarin anticoagulation        Past Surgical History:   Procedure Laterality Date    CARDIAC CATHETERIZATION  11/04/2011    x1 stent at [facility]    CARDIAC CATHETERIZATION N/A 9/29/2022    Procedure: Coronary angiography;  Surgeon: Mckinley Wall MD;  Location:  [facility];  Service: Cardiology;  Laterality: N/A;    CARDIAC CATHETERIZATION N/A 9/29/2022    Procedure: Left Heart Cath;  Surgeon: Mckinley Wall MD;  Location:  [facility];  Service: Cardiology;  Laterality: N/A;    CARDIAC CATHETERIZATION N/A 9/29/2022    Procedure: Stent RAISSA coronary;  Surgeon: Mckinley Wall MD;  Location:  [facility];  Service: Cardiology;  Laterality: N/A;    CARDIAC CATHETERIZATION N/A 9/29/2022    Procedure: Percutaneous Coronary Intervention;  Surgeon: Mckinley Wall MD;  Location:  [facility];  Service: Cardiology;  Laterality: N/A;    COLONOSCOPY      COLONOSCOPY N/A 7/16/2024    Procedure: COLONOSCOPY FOR SCREENING to Cecum with Polypectomy;  Surgeon: Avtar Whitney MD;  Location: [facility] OR;  Service: Gastroenterology;  Laterality: N/A;  Diverticulosis, Rectal  Polyp with a Cold Snare    CORONARY ANGIOPLASTY WITH STENT PLACEMENT  11/04/2011    The patient underwent successful angioplasty and stent to the marginal and 95% stenosis reduced to 0%.    ENDOSCOPY      ENDOSCOPY N/A 9/14/2022    Procedure: ESOPHAGOGASTRODUODENOSCOPY;  Surgeon: Avtar Whitney MD;  Location: [facility] OR;  Service: Gastroenterology;  Laterality: N/A;  IRREGULAR Z LINE FOR HIATAL HERNIA    HAND  TENDON SURGERY      TENDON REPAIR RIGHT    HERNIA REPAIR         Social History     Socioeconomic History    Marital status:    Tobacco Use    Smoking status: Never    Smokeless tobacco: Never    Tobacco comments:     CAFFEINE USE: 1 CUP COFFEE DAILY   Vaping Use    Vaping status: Never Used   Substance and Sexual Activity    Alcohol use: Yes     Alcohol/week: 7.0 standard drinks of alcohol     Types: 7 Cans of beer per week    Drug use: No    Sexual activity: Not Currently       Family History   Problem Relation Age of Onset    No Known Problems Mother     Throat cancer Father     Arthritis Other     Heart disease Maternal Uncle     Colon cancer Neg Hx     Colon polyps Neg Hx     Crohn's disease Neg Hx     Irritable bowel syndrome Neg Hx     Ulcerative colitis Neg Hx        Review of Systems   Constitutional: Negative. Negative for fever and malaise/fatigue.   HENT: Negative.  Negative for nosebleeds and sore throat.    Eyes: Negative.  Negative for blurred vision and double vision.   Cardiovascular: Negative.  Negative for chest pain, claudication, palpitations and syncope.   Respiratory: Negative.  Negative for cough, shortness of breath and snoring.    Endocrine: Negative.  Negative for cold intolerance, heat intolerance and polydipsia.   Skin: Negative.  Negative for itching, poor wound healing and rash.   Musculoskeletal: Negative.  Negative for joint pain, joint swelling, muscle weakness and myalgias.   Gastrointestinal: Negative.  Negative for abdominal pain, melena, nausea and vomiting.   Genitourinary: Negative.    Neurological: Negative.  Negative for light-headedness, loss of balance, seizures, vertigo and weakness.   Psychiatric/Behavioral: Negative.  Negative for altered mental status and depression.    Allergic/Immunologic: Negative.        Allergies   Allergen Reactions    Crestor [Rosuvastatin Calcium] Myalgia    Pravastatin Sodium Myalgia    Statins Myalgia    Sulfa Antibiotics Unknown -  "High Severity and Unknown (See Comments)     Category: myalgia;    Codeine Rash         Current Outpatient Medications:     allopurinol (ZYLOPRIM) 100 MG tablet, Take 1 tablet by mouth 2 (Two) Times a Day., Disp: , Rfl:     baclofen (LIORESAL) 10 MG tablet, Take 1 tablet by mouth As Needed for Muscle Spasms., Disp: , Rfl:     clopidogrel (PLAVIX) 75 MG tablet, Take 1 tablet by mouth Daily., Disp: 90 tablet, Rfl: 3    Diclofenac Sodium (VOLTAREN) 1 % gel gel, Apply 2 g topically to the appropriate area as directed 4 (Four) Times a Day., Disp: , Rfl:     dutasteride (AVODART) 0.5 MG capsule, , Disp: , Rfl:     ketoconazole (NIZORAL) 2 % cream, APPLY TOPICALLY TO GROIN TWICE DAILY AS NEEDED FOR FLARES, Disp: , Rfl:     losartan (COZAAR) 50 MG tablet, Take 1 tablet by mouth Daily., Disp: 90 tablet, Rfl: 3    metoprolol succinate XL (TOPROL-XL) 25 MG 24 hr tablet, Take 0.5 tablets by mouth Daily., Disp: 45 tablet, Rfl: 3    nitroglycerin (NITROSTAT) 0.4 MG SL tablet, 1 under the tongue as needed for angina, may repeat q5mins for up three doses, Disp: 100 tablet, Rfl: 11    pantoprazole (Protonix) 20 MG EC tablet, Take 1 tablet by mouth Daily., Disp: 90 tablet, Rfl: 3    PreviDent 5000 Booster Plus 1.1 % paste, , Disp: , Rfl:     Repatha SureClick solution auto-injector SureClick injection, ADMINISTER 1 ML UNDER THE SKIN EVERY 14 DAYS, Disp: 2 mL, Rfl: 11    warfarin (COUMADIN) 4 MG tablet, Take 1 tablet by mouth Daily., Disp: , Rfl:       Objective:     Vitals:    08/22/24 1000   BP: 118/80   Pulse: 60   Weight: 91.2 kg (201 lb)   Height: 175.3 cm (69\")       Body mass index is 29.68 kg/m².       PHYSICAL EXAM:    Constitutional:       Appearance: Healthy appearance. Not in distress.   Neck:      Vascular: No JVR. JVD normal.   Pulmonary:      Effort: Pulmonary effort is normal.      Breath sounds: Normal breath sounds. No wheezing. No rhonchi. No rales.   Chest:      Chest wall: Not tender to palpatation. "   Cardiovascular:      PMI at left midclavicular line. Normal rate. Regular rhythm. Normal S1. Normal S2.       Murmurs: There is no murmur.      No gallop.  No click. No rub.   Pulses:     Intact distal pulses.   Edema:     Peripheral edema absent.   Abdominal:      General: Bowel sounds are normal.      Palpations: Abdomen is soft.      Tenderness: There is no abdominal tenderness.   Musculoskeletal: Normal range of motion.         General: No tenderness. Skin:     General: Skin is warm and dry.   Neurological:      General: No focal deficit present.      Mental Status: Alert and oriented to person, place and time.           ECG 12 Lead    Date/Time: 8/22/2024 10:07 AM  Performed by: Tra Ashley MD    Authorized by: Tra Ashley MD  Comparison: compared with previous ECG from 12/6/2023  Similar to previous ECG  Rhythm: sinus rhythm  Rate: normal  QRS axis: normal          Cardiac studies:  Left heart cath 9/29/2022:   Dominance: Right  Left Main: Large caliber vessel with 20% mid lesion  Left Anterior Descending: Medium caliber vessel with a 99% proximal lesion.  Diagonals: Medium caliber vessel in the proximal portion with a 70% ostial lesion.  Circumflex Artery: Medium caliber vessel which gives rise to a large OM.  There is a 50% mid lesion.  There is also a 50% lesion in the OM1 proximal to the prior stent.  There is a patent stent in OM1.  Right Coronary Artery: Large caliber vessel with a 30% proximal lesion.  There is also a 30% mid lesion.  The PDA and RPL are medium caliber vessels that are free of disease.      Left Ventricle: 108/8 mmHg  Aorta: 125/59/85 mmHg    Lexiscan stress test 9/26/2022:  LVEF 63%, abnormal LV wall motion consistent with severe hypokinesis of anterior wall.  Large sized, severe area of ischemia located in anterior wall, apex and septal wall.    2D Echocardiogram 2/3/2017:  Normal left ventricular systolic function.  Mild concentric left ventricular  hypertrophy  Ejection fraction visually estimated at 65%  Grade 1 diastolic dysfunction  Normal right ventricular size and function  No obvious Wisdom sign seen  No significant valvular disease      Assessment:      Plan:       1.  Coronary artery disease: Status post previous stent to OM1 (2011), status post successful PCI with RAISSA to proximal LAD and balloon angioplasty to severe D1 lesion (09/2022).  Normal LV systolic function.   -Patient continues on Plavix 75 mg p.o. daily.  No changes in that.  - Continue low-dose Toprol 25 mg p.o. daily.  Heart rate controlled.    2.  Hypertension: Well-controlled on beta-blocker and ARB.  No changes in dose of metoprolol succinate. Recommend a decrease in the Losartan to 25mg daily due to lower blood pressures and home and intermittent lightheadedness.     3.  Hyperlipidemia: Lipid panel in May 2024 with LDL 34.  Excellent control     4.  History of PE/DVT: on warfarin.  INR followed by PCP office           Your medication list            Accurate as of August 22, 2024 10:07 AM. If you have any questions, ask your nurse or doctor.                CONTINUE taking these medications        Instructions Last Dose Given Next Dose Due   allopurinol 100 MG tablet  Commonly known as: ZYLOPRIM      Take 1 tablet by mouth 2 (Two) Times a Day.       baclofen 10 MG tablet  Commonly known as: LIORESAL      Take 1 tablet by mouth As Needed for Muscle Spasms.       clopidogrel 75 MG tablet  Commonly known as: PLAVIX      Take 1 tablet by mouth Daily.       Diclofenac Sodium 1 % gel gel  Commonly known as: VOLTAREN      Apply 2 g topically to the appropriate area as directed 4 (Four) Times a Day.       dutasteride 0.5 MG capsule  Commonly known as: AVODART           ketoconazole 2 % cream  Commonly known as: NIZORAL      APPLY TOPICALLY TO GROIN TWICE DAILY AS NEEDED FOR FLARES       losartan 50 MG tablet  Commonly known as: COZAAR      Take 1 tablet by mouth Daily.       metoprolol  succinate XL 25 MG 24 hr tablet  Commonly known as: TOPROL-XL      Take 0.5 tablets by mouth Daily.       nitroglycerin 0.4 MG SL tablet  Commonly known as: NITROSTAT      1 under the tongue as needed for angina, may repeat q5mins for up three doses       pantoprazole 20 MG EC tablet  Commonly known as: Protonix      Take 1 tablet by mouth Daily.       PreviDent 5000 Booster Plus 1.1 % paste  Generic drug: Sodium Fluoride           Repatha SureClick solution auto-injector SureClick injection  Generic drug: Evolocumab      ADMINISTER 1 ML UNDER THE SKIN EVERY 14 DAYS       warfarin 4 MG tablet  Commonly known as: COUMADIN      Take 1 tablet by mouth Daily.

## 2024-10-14 RX ORDER — CLOPIDOGREL BISULFATE 75 MG/1
75 TABLET ORAL DAILY
Qty: 90 TABLET | Refills: 3 | Status: SHIPPED | OUTPATIENT
Start: 2024-10-14

## 2025-01-09 RX ORDER — METOPROLOL SUCCINATE 25 MG/1
12.5 TABLET, EXTENDED RELEASE ORAL DAILY
Qty: 45 TABLET | Refills: 3 | Status: SHIPPED | OUTPATIENT
Start: 2025-01-09

## 2025-01-09 NOTE — TELEPHONE ENCOUNTER
Pt called back. He takes Metoprolol 12.5 mg BID. Sent script to his pharmacy. He verbalized understanding.    Thank you,    Amairani Ng RN  [place] [facility] [place]  01[place]09/25 12:09 EST

## 2025-01-09 NOTE — TELEPHONE ENCOUNTER
"  Caller: Miguel Espitia \"KATTY\"    Relationship: Self    Best call back number: [phone]    Who are you requesting to speak with (clinical staff, provider,  specific staff member): CLINICAL    What was the call regarding: PT IS RETURNING A MISSED CALL  "

## 2025-03-15 DIAGNOSIS — E78.2 MIXED HYPERLIPIDEMIA: ICD-10-CM

## 2025-03-20 RX ORDER — EVOLOCUMAB 140 MG/ML
INJECTION, SOLUTION SUBCUTANEOUS
Qty: 2 ML | Refills: 11 | Status: SHIPPED | OUTPATIENT
Start: 2025-03-20

## 2025-03-21 DIAGNOSIS — K22.70 BARRETT'S ESOPHAGUS WITHOUT DYSPLASIA: ICD-10-CM

## 2025-03-24 RX ORDER — PANTOPRAZOLE SODIUM 40 MG/1
40 TABLET, DELAYED RELEASE ORAL DAILY
Qty: 90 TABLET | Refills: 3 | Status: SHIPPED | OUTPATIENT
Start: 2025-03-24

## 2025-07-17 RX ORDER — CLOPIDOGREL BISULFATE 75 MG/1
75 TABLET ORAL DAILY
Qty: 90 TABLET | Refills: 1 | Status: SHIPPED | OUTPATIENT
Start: 2025-07-17

## 2025-08-26 ENCOUNTER — OFFICE VISIT (OUTPATIENT)
Age: 80
End: 2025-08-26
Payer: MEDICARE

## 2025-08-26 VITALS
SYSTOLIC BLOOD PRESSURE: 110 MMHG | HEART RATE: 64 BPM | DIASTOLIC BLOOD PRESSURE: 72 MMHG | WEIGHT: 198.2 LBS | HEIGHT: 69 IN | BODY MASS INDEX: 29.36 KG/M2

## 2025-08-26 DIAGNOSIS — I10 ESSENTIAL HYPERTENSION: ICD-10-CM

## 2025-08-26 DIAGNOSIS — I25.10 CORONARY ARTERY DISEASE INVOLVING NATIVE CORONARY ARTERY OF NATIVE HEART WITHOUT ANGINA PECTORIS: Primary | ICD-10-CM

## 2025-08-26 DIAGNOSIS — E78.2 MIXED HYPERLIPIDEMIA: ICD-10-CM

## 2025-08-26 PROCEDURE — 99214 OFFICE O/P EST MOD 30 MIN: CPT | Performed by: NURSE PRACTITIONER

## 2025-08-26 PROCEDURE — 93000 ELECTROCARDIOGRAM COMPLETE: CPT | Performed by: NURSE PRACTITIONER

## 2025-08-26 PROCEDURE — 1160F RVW MEDS BY RX/DR IN RCRD: CPT | Performed by: NURSE PRACTITIONER

## 2025-08-26 PROCEDURE — 3078F DIAST BP <80 MM HG: CPT | Performed by: NURSE PRACTITIONER

## 2025-08-26 PROCEDURE — 3074F SYST BP LT 130 MM HG: CPT | Performed by: NURSE PRACTITIONER

## 2025-08-26 PROCEDURE — 1159F MED LIST DOCD IN RCRD: CPT | Performed by: NURSE PRACTITIONER

## (undated) DEVICE — CATH DIAG IMPULSE FL3.5 5F 100CM

## (undated) DEVICE — 6F .070 JL3.5 100CM: Brand: CORDIS

## (undated) DEVICE — ADAPT CLN SCPE ENDO PORPOISE BX/50 DISP

## (undated) DEVICE — GOWN ,SIRUS,NONREINFORCED 3XL: Brand: MEDLINE

## (undated) DEVICE — GW EMR FIX EXCHG J STD .035 3MM 260CM

## (undated) DEVICE — SYR LL 3CC

## (undated) DEVICE — GOWN ISOL W/THUMB UNIV BLU BX/15

## (undated) DEVICE — TREK CORONARY DILATATION CATHETER 2.50 MM X 15 MM / RAPID-EXCHANGE: Brand: TREK

## (undated) DEVICE — KT MANIFLD CARDIAC

## (undated) DEVICE — PK CATH CARD 40

## (undated) DEVICE — DEV INDEFLATOR P/N 580289

## (undated) DEVICE — KT ORCA ORCAPOD DISP STRL

## (undated) DEVICE — SINGLE-USE BIOPSY FORCEPS: Brand: RADIAL JAW 4

## (undated) DEVICE — Device

## (undated) DEVICE — GLIDESHEATH SLENDER STAINLESS STEEL KIT: Brand: GLIDESHEATH SLENDER

## (undated) DEVICE — ADAPT CLN BIOGUARD AIR/H2O DISP

## (undated) DEVICE — TR BAND RADIAL ARTERY COMPRESSION DEVICE: Brand: TR BAND

## (undated) DEVICE — THE BITE BLOCK MAXI, LATEX FREE STRAP IS USED TO PROTECT THE ENDOSCOPE INSERTION TUBE FROM BEING BITTEN BY THE PATIENT.

## (undated) DEVICE — CATH DIAG IMPULSE FR4 5F 100CM

## (undated) DEVICE — BW-412T DISP COMBO CLEANING BRUSH: Brand: SINGLE USE COMBINATION CLEANING BRUSH

## (undated) DEVICE — SYRINGE, LUER SLIP, STERILE, 60ML: Brand: MEDLINE

## (undated) DEVICE — CANN O2 ETCO2 FITS ALL CONN CO2 SMPL A/ 7IN DISP LF

## (undated) DEVICE — FLEX ADVANTAGE 1500CC: Brand: FLEX ADVANTAGE

## (undated) DEVICE — VIAL FORMLN CAP 10PCT 20ML

## (undated) DEVICE — SAFELINER SUCTION CANISTER 1000CC: Brand: DEROYAL

## (undated) DEVICE — Device: Brand: ASAHI SION BLUE

## (undated) DEVICE — SNAR POLYP CAPTIVATOR/COLD STFF RND 10MM 240CM